# Patient Record
Sex: FEMALE | Race: WHITE | NOT HISPANIC OR LATINO | Employment: FULL TIME | URBAN - NONMETROPOLITAN AREA
[De-identification: names, ages, dates, MRNs, and addresses within clinical notes are randomized per-mention and may not be internally consistent; named-entity substitution may affect disease eponyms.]

---

## 2017-11-22 ENCOUNTER — TRANSCRIBE ORDERS (OUTPATIENT)
Dept: ADMINISTRATIVE | Facility: HOSPITAL | Age: 37
End: 2017-11-22

## 2017-11-22 DIAGNOSIS — M54.12 CERVICAL RADICULOPATHY: Primary | ICD-10-CM

## 2017-12-06 ENCOUNTER — TRANSCRIBE ORDERS (OUTPATIENT)
Dept: ADMINISTRATIVE | Facility: HOSPITAL | Age: 37
End: 2017-12-06

## 2017-12-06 DIAGNOSIS — M54.2 NECK PAIN: Primary | ICD-10-CM

## 2017-12-13 ENCOUNTER — HOSPITAL ENCOUNTER (OUTPATIENT)
Dept: MRI IMAGING | Facility: HOSPITAL | Age: 37
Discharge: HOME | End: 2017-12-13
Attending: ORTHOPAEDIC SURGERY | Admitting: ORTHOPAEDIC SURGERY

## 2017-12-13 DIAGNOSIS — M54.2 NECK PAIN: ICD-10-CM

## 2017-12-13 PROCEDURE — 72141 MRI NECK SPINE W/O DYE: CPT

## 2017-12-26 ENCOUNTER — HOSPITAL ENCOUNTER (OUTPATIENT)
Dept: NEUROLOGY | Facility: HOSPITAL | Age: 37
Discharge: HOME | End: 2017-12-26
Attending: ORTHOPAEDIC SURGERY | Admitting: ORTHOPAEDIC SURGERY

## 2017-12-26 DIAGNOSIS — M54.12 CERVICAL RADICULOPATHY: ICD-10-CM

## 2017-12-26 PROCEDURE — 95886 MUSC TEST DONE W/N TEST COMP: CPT

## 2017-12-26 PROCEDURE — 95909 NRV CNDJ TST 5-6 STUDIES: CPT | Performed by: PSYCHIATRY & NEUROLOGY

## 2017-12-26 PROCEDURE — 95909 NRV CNDJ TST 5-6 STUDIES: CPT

## 2017-12-26 PROCEDURE — 95886 MUSC TEST DONE W/N TEST COMP: CPT | Performed by: PSYCHIATRY & NEUROLOGY

## 2019-10-07 ENCOUNTER — TRANSCRIBE ORDERS (OUTPATIENT)
Dept: ADMINISTRATIVE | Facility: HOSPITAL | Age: 39
End: 2019-10-07

## 2019-10-07 DIAGNOSIS — M54.14 THORACIC RADICULOPATHY: Primary | ICD-10-CM

## 2019-10-21 ENCOUNTER — HOSPITAL ENCOUNTER (OUTPATIENT)
Dept: MRI IMAGING | Facility: HOSPITAL | Age: 39
Discharge: HOME | End: 2019-10-21
Admitting: NURSE PRACTITIONER

## 2019-10-21 DIAGNOSIS — M54.14 THORACIC RADICULOPATHY: ICD-10-CM

## 2019-10-21 PROCEDURE — 72146 MRI CHEST SPINE W/O DYE: CPT

## 2021-03-19 ENCOUNTER — TRANSCRIBE ORDERS (OUTPATIENT)
Dept: ADMINISTRATIVE | Facility: HOSPITAL | Age: 41
End: 2021-03-19

## 2021-03-19 DIAGNOSIS — M54.16 LUMBAR RADICULOPATHY: Primary | ICD-10-CM

## 2021-03-26 ENCOUNTER — HOSPITAL ENCOUNTER (OUTPATIENT)
Dept: MRI IMAGING | Facility: HOSPITAL | Age: 41
Discharge: HOME | End: 2021-03-26
Admitting: NURSE PRACTITIONER

## 2021-03-26 DIAGNOSIS — M54.16 LUMBAR RADICULOPATHY: ICD-10-CM

## 2021-03-26 PROCEDURE — 72148 MRI LUMBAR SPINE W/O DYE: CPT

## 2021-06-22 ENCOUNTER — TRANSCRIBE ORDERS (OUTPATIENT)
Dept: ADMINISTRATIVE | Facility: HOSPITAL | Age: 41
End: 2021-06-22

## 2021-06-22 DIAGNOSIS — Z01.818 PREOPERATIVE TESTING: Primary | ICD-10-CM

## 2021-06-23 ENCOUNTER — PRE-ADMISSION TESTING (OUTPATIENT)
Dept: PREADMISSION TESTING | Facility: HOSPITAL | Age: 41
End: 2021-06-23

## 2021-06-23 VITALS
HEIGHT: 65 IN | BODY MASS INDEX: 42.68 KG/M2 | RESPIRATION RATE: 20 BRPM | OXYGEN SATURATION: 99 % | SYSTOLIC BLOOD PRESSURE: 147 MMHG | DIASTOLIC BLOOD PRESSURE: 91 MMHG | WEIGHT: 256.17 LBS | HEART RATE: 87 BPM

## 2021-06-23 LAB
ALBUMIN SERPL-MCNC: 4.1 G/DL (ref 3.5–5.2)
ALBUMIN/GLOB SERPL: 1.4 G/DL
ALP SERPL-CCNC: 53 U/L (ref 39–117)
ALT SERPL W P-5'-P-CCNC: 31 U/L (ref 1–33)
ANION GAP SERPL CALCULATED.3IONS-SCNC: 10 MMOL/L (ref 5–15)
AST SERPL-CCNC: 19 U/L (ref 1–32)
BASOPHILS # BLD AUTO: 0.04 10*3/MM3 (ref 0–0.2)
BASOPHILS NFR BLD AUTO: 0.3 % (ref 0–1.5)
BILIRUB SERPL-MCNC: 0.4 MG/DL (ref 0–1.2)
BUN SERPL-MCNC: 9 MG/DL (ref 6–20)
BUN/CREAT SERPL: 13.4 (ref 7–25)
CALCIUM SPEC-SCNC: 9.5 MG/DL (ref 8.6–10.5)
CHLORIDE SERPL-SCNC: 104 MMOL/L (ref 98–107)
CO2 SERPL-SCNC: 25 MMOL/L (ref 22–29)
CREAT SERPL-MCNC: 0.67 MG/DL (ref 0.57–1)
DEPRECATED RDW RBC AUTO: 41.1 FL (ref 37–54)
EOSINOPHIL # BLD AUTO: 0.12 10*3/MM3 (ref 0–0.4)
EOSINOPHIL NFR BLD AUTO: 1 % (ref 0.3–6.2)
ERYTHROCYTE [DISTWIDTH] IN BLOOD BY AUTOMATED COUNT: 13.3 % (ref 12.3–15.4)
GFR SERPL CREATININE-BSD FRML MDRD: 97 ML/MIN/1.73
GLOBULIN UR ELPH-MCNC: 2.9 GM/DL
GLUCOSE SERPL-MCNC: 105 MG/DL (ref 65–99)
HCT VFR BLD AUTO: 43.4 % (ref 34–46.6)
HGB BLD-MCNC: 14.4 G/DL (ref 12–15.9)
LYMPHOCYTES # BLD AUTO: 4.18 10*3/MM3 (ref 0.7–3.1)
LYMPHOCYTES NFR BLD AUTO: 34.1 % (ref 19.6–45.3)
MCH RBC QN AUTO: 28.3 PG (ref 26.6–33)
MCHC RBC AUTO-ENTMCNC: 33.2 G/DL (ref 31.5–35.7)
MCV RBC AUTO: 85.4 FL (ref 79–97)
MONOCYTES # BLD AUTO: 0.83 10*3/MM3 (ref 0.1–0.9)
MONOCYTES NFR BLD AUTO: 6.8 % (ref 5–12)
NEUTROPHILS NFR BLD AUTO: 57.1 % (ref 42.7–76)
NEUTROPHILS NFR BLD AUTO: 7 10*3/MM3 (ref 1.7–7)
PLATELET # BLD AUTO: 151 10*3/MM3 (ref 140–450)
PMV BLD AUTO: 13.2 FL (ref 6–12)
POTASSIUM SERPL-SCNC: 3.8 MMOL/L (ref 3.5–5.2)
PROT SERPL-MCNC: 7 G/DL (ref 6–8.5)
RBC # BLD AUTO: 5.08 10*6/MM3 (ref 3.77–5.28)
SODIUM SERPL-SCNC: 139 MMOL/L (ref 136–145)
WBC # BLD AUTO: 12.25 10*3/MM3 (ref 3.4–10.8)

## 2021-06-23 PROCEDURE — 36415 COLL VENOUS BLD VENIPUNCTURE: CPT

## 2021-06-23 PROCEDURE — 85025 COMPLETE CBC W/AUTO DIFF WBC: CPT

## 2021-06-23 PROCEDURE — 80053 COMPREHEN METABOLIC PANEL: CPT

## 2021-06-23 RX ORDER — GABAPENTIN 300 MG/1
300 CAPSULE ORAL 3 TIMES DAILY
COMMUNITY

## 2021-06-23 RX ORDER — OXYCODONE AND ACETAMINOPHEN 10; 325 MG/1; MG/1
1 TABLET ORAL EVERY 8 HOURS PRN
COMMUNITY

## 2021-06-23 RX ORDER — TIZANIDINE 4 MG/1
4 TABLET ORAL AS NEEDED
COMMUNITY

## 2021-06-23 NOTE — DISCHARGE INSTRUCTIONS
PATIENT/FAMILY/RESPONSIBLE PARTY VERBALIZES UNDERSTANDING OF ABOVE EDUCATION.  COPY OF PAIN SCALE GIVEN AND REVIEWED WITH VERBALIZED UNDERSTANDING.  DAY OF SURGERY INSTRUCTIONS        YOUR SURGEON: Michealcristy Ortiz     PROCEDURE: Laparoscopic Cholecystectomy With Cholangiogram     DATE OF SURGERY: 06/29/21    ARRIVAL TIME: AS DIRECTED BY OFFICE    YOU MAY TAKE THE FOLLOWING MEDICATION(S) THE MORNING OF SURGERY WITH A SIP OF WATER: Percocet, Neurontin       ALL OTHER HOME MEDICATION CHECK WITH YOUR PHYSICIAN      DO NOT TAKE ANY ERECTILE DYSFUNCTION MEDICATIONS (EX: CIALIS, VIAGRA) 24 HOURS PRIOR TO SURGERY                      MANAGING PAIN AFTER SURGERY    We know you are probably wondering what your pain will be like after surgery.  Following surgery it is unrealistic to expect you will not have pain.   Pain is how our bodies let us know that something is wrong or cautions us to be careful.  That said, our goal is to make your pain tolerable.    Methods we may use to treat your pain include (oral or IV medications, PCAs, epidurals, nerve blocks, etc.)   While some procedures require IV pain medications for a short time after surgery, transitioning to pain medications by mouth allows for better management of pain.   Your nurse will encourage you to take oral pain medications whenever possible.  IV medications work almost immediately, but only last a short while.  Taking medications by mouth allows for a more constant level of medication in your blood stream for a longer period of time.      Once your pain is out of control it is harder to get back under control.  It is important you are aware when your next dose of pain medication is due.  If you are admitted, your nurse may write the time of your next dose on the white board in your room to help you remember.      We are interested in your pain and encourage you to inform us about aggravating factors during your visit.   Many times a simple repositioning every few  hours can make a big difference.    If your physician says it is okay, do not let your pain prevent you from getting out of bed. Be sure to call your nurse for assistance prior to getting up so you do not fall.      Before surgery, please decide your tolerable pain goal.  These faces help describe the pain ratings we use on a 0-10 scale.   Be prepared to tell us your goal and whether or not you take pain or anxiety medications at home.          BEFORE YOU COME TO THE HOSPITAL  (Pre-op instructions)  • Do not eat, drink, smoke or chew gum after midnight the night before surgery.  This also includes no mints.  • Morning of surgery take only the medicines you have been instructed with a sip of water unless otherwise instructed  by your physician.  • Do not shave, wear makeup or dark nail polish.  • Remove all jewelry including rings.  • Leave anything you consider valuable at home.  • Leave your suitcase in the car until after your surgery.  • Bring the following with you if applicable:  o Picture ID and insurance, Medicare or Medicaid cards  o Co-pay/deductible required by insurance (cash, check, credit card)  o Copy of advance directive, living will or power-of- documents if not brought to PAT  o CPAP or BIPAP mask and tubing  o Relaxation aids ( book, magazine), etc.  o Hearing aids                        ON THE DAY OF SURGERY  · On the day of surgery check in at registration located at the main entrance of the hospital.   ? You will be registered and given a beeper with instructions where to wait in the main lobby.  ? When your beeper lights up and vibrates a member of the Outpatient Surgery staff will meet you at the double doors under the stair steps and escort you to your preoperative room.   · You may have cloth compression devices placed on your legs. These help to prevent blood clots and reduce swelling in your legs.  · An IV may be inserted into one of your veins.  · In the operating room, you may be  "given one or more of the following:  ? A medicine to help you relax (sedative).  ? A medicine to numb the area (local anesthetic).  ? A medicine to make you fall asleep (general anesthetic).  ? A medicine that is injected into an area of your body to numb everything below the injection site (regional anesthetic).  · Your surgical site will be marked or identified.  · You may be given an antibiotic through your IV to help prevent infection.  Contact a health care provider if you:  · Develop a fever of more than 100.4°F (38°C) or other feelings of illness during the 48 hours before your surgery.  · Have symptoms that get worse.  Have questions or concerns about your surgery    General Anesthesia/Surgery, Adult  General anesthesia is the use of medicines to make a person \"go to sleep\" (unconscious) for a medical procedure. General anesthesia must be used for certain procedures, and is often recommended for procedures that:  · Last a long time.  · Require you to be still or in an unusual position.  · Are major and can cause blood loss.  The medicines used for general anesthesia are called general anesthetics. As well as making you unconscious for a certain amount of time, these medicines:  · Prevent pain.  · Control your blood pressure.  · Relax your muscles.  Tell a health care provider about:  · Any allergies you have.  · All medicines you are taking, including vitamins, herbs, eye drops, creams, and over-the-counter medicines.  · Any problems you or family members have had with anesthetic medicines.  · Types of anesthetics you have had in the past.  · Any blood disorders you have.  · Any surgeries you have had.  · Any medical conditions you have.  · Any recent upper respiratory, chest, or ear infections.  · Any history of:  ? Heart or lung conditions, such as heart failure, sleep apnea, asthma, or chronic obstructive pulmonary disease (COPD).  ?  service.  ? Depression or anxiety.  · Any tobacco or drug use, " including marijuana or alcohol use.  · Whether you are pregnant or may be pregnant.  What are the risks?  Generally, this is a safe procedure. However, problems may occur, including:  · Allergic reaction.  · Lung and heart problems.  · Inhaling food or liquid from the stomach into the lungs (aspiration).  · Nerve injury.  · Air in the bloodstream, which can lead to stroke.  · Extreme agitation or confusion (delirium) when you wake up from the anesthetic.  · Waking up during your procedure and being unable to move. This is rare.  These problems are more likely to develop if you are having a major surgery or if you have an advanced or serious medical condition. You can prevent some of these complications by answering all of your health care provider's questions thoroughly and by following all instructions before your procedure.  General anesthesia can cause side effects, including:  · Nausea or vomiting.  · A sore throat from the breathing tube.  · Hoarseness.  · Wheezing or coughing.  · Shaking chills.  · Tiredness.  · Body aches.  · Anxiety.  · Sleepiness or drowsiness.  · Confusion or agitation.  RISKS AND COMPLICATIONS OF SURGERY  Your health care provider will discuss possible risks and complications with you before surgery. Common risks and complications include:    · Problems due to the use of anesthetics.  · Blood loss and replacement (does not apply to minor surgical procedures).  · Temporary increase in pain due to surgery.  · Uncorrected pain or problems that the surgery was meant to correct.  · Infection.  · New damage.    What happens before the procedure?    Medicines  Ask your health care provider about:  · Changing or stopping your regular medicines. This is especially important if you are taking diabetes medicines or blood thinners.  · Taking medicines such as aspirin and ibuprofen. These medicines can thin your blood. Do not take these medicines unless your health care provider tells you to take  them.  · Taking over-the-counter medicines, vitamins, herbs, and supplements. Do not take these during the week before your procedure unless your health care provider approves them.  General instructions  · Starting 3-6 weeks before the procedure, do not use any products that contain nicotine or tobacco, such as cigarettes and e-cigarettes. If you need help quitting, ask your health care provider.  · If you brush your teeth on the morning of the procedure, make sure to spit out all of the toothpaste.  · Tell your health care provider if you become ill or develop a cold, cough, or fever.  · If instructed by your health care provider, bring your sleep apnea device with you on the day of your surgery (if applicable).  · Ask your health care provider if you will be going home the same day, the following day, or after a longer hospital stay.  ? Plan to have someone take you home from the hospital or clinic.  ? Plan to have a responsible adult care for you for at least 24 hours after you leave the hospital or clinic. This is important.  What happens during the procedure?  · You will be given anesthetics through both of the following:  ? A mask placed over your nose and mouth.  ? An IV in one of your veins.  · You may receive a medicine to help you relax (sedative).  · After you are unconscious, a breathing tube may be inserted down your throat to help you breathe. This will be removed before you wake up.  · An anesthesia specialist will stay with you throughout your procedure. He or she will:  ? Keep you comfortable and safe by continuing to give you medicines and adjusting the amount of medicine that you get.  ? Monitor your blood pressure, pulse, and oxygen levels to make sure that the anesthetics do not cause any problems.  The procedure may vary among health care providers and hospitals.  What happens after the procedure?  · Your blood pressure, temperature, heart rate, breathing rate, and blood oxygen level will be  monitored until the medicines you were given have worn off.  · You will wake up in a recovery area. You may wake up slowly.  · If you feel anxious or agitated, you may be given medicine to help you calm down.  · If you will be going home the same day, your health care provider may check to make sure you can walk, drink, and urinate.  · Your health care provider will treat any pain or side effects you have before you go home.  · Do not drive for 24 hours if you were given a sedative.  Summary  · General anesthesia is used to keep you still and prevent pain during a procedure.  · It is important to tell your healthcare provider about your medical history and any surgeries you have had, and previous experience with anesthesia.  · Follow your healthcare provider’s instructions about when to stop eating, drinking, or taking certain medicines before your procedure.  · Plan to have someone take you home from the hospital or clinic.  This information is not intended to replace advice given to you by your health care provider. Make sure you discuss any questions you have with your health care provider.  Document Released: 03/26/2009 Document Revised: 08/03/2018 Document Reviewed: 08/03/2018  [employer] Interactive Patient Education © 2019 [employer]       Fall Prevention in Hospitals, Adult  As a hospital patient, your condition and the treatments you receive can increase your risk for falls. Some additional risk factors for falls in a hospital include:  · Being in an unfamiliar environment.  · Being on bed rest.  · Your surgery.  · Taking certain medicines.  · Your tubing requirements, such as intravenous (IV) therapy or catheters.  It is important that you learn how to decrease fall risks while at the hospital. Below are important tips that can help prevent falls.  SAFETY TIPS FOR PREVENTING FALLS  Talk about your risk of falling.  · Ask your health care provider why you are at risk for falling. Is it your medicine,  illness, tubing placement, or something else?  · Make a plan with your health care provider to keep you safe from falls.  · Ask your health care provider or pharmacist about side effects of your medicines. Some medicines can make you dizzy or affect your coordination.  Ask for help.  · Ask for help before getting out of bed. You may need to press your call button.  · Ask for assistance in getting safely to the toilet.  · Ask for a walker or cane to be put at your bedside. Ask that most of the side rails on your bed be placed up before your health care provider leaves the room.  · Ask family or friends to sit with you.  · Ask for things that are out of your reach, such as your glasses, hearing aids, telephone, bedside table, or call button.  Follow these tips to avoid falling:  · Stay lying or seated, rather than standing, while waiting for help.  · Wear rubber-soled slippers or shoes whenever you walk in the hospital.  · Avoid quick, sudden movements.  ¨ Change positions slowly.  ¨ Sit on the side of your bed before standing.  ¨ Stand up slowly and wait before you start to walk.  · Let your health care provider know if there is a spill on the floor.  · Pay careful attention to the medical equipment, electrical cords, and tubes around you.  · When you need help, use your call button by your bed or in the bathroom. Wait for one of your health care providers to help you.  · If you feel dizzy or unsure of your footing, return to bed and wait for assistance.  · Avoid being distracted by the TV, telephone, or another person in your room.  · Do not lean or support yourself on rolling objects, such as IV poles or bedside tables.     This information is not intended to replace advice given to you by your health care provider. Make sure you discuss any questions you have with your health care provider.     Document Released: 12/15/2001 Document Revised: 01/08/2016 Document Reviewed: 08/25/2013  [employer] Patient  Education ©2016 [employer]       [facility]  CHG 4% Patient Instruction Sheet    Chlorhexidine Before Surgery  Chlorhexidine gluconate (CHG) is a germ-killing (antiseptic) solution that is used to clean the skin. It gets rid of the bacteria that normally live on the skin. Cleaning your skin with CHG before surgery helps lower the risk for infection after surgery.    How to use CHG solution  · You will take 2 showers, one shower the night before surgery, the second shower the morning of surgery before coming to the hospital.  · Use CHG only as told by your health care provider, and follow the instructions on the label.  · Use CHG solution while taking a shower. Follow these steps when using CHG solution (unless your health care provider gives you different instructions):  1. Start the shower.  2. Use your normal soap and shampoo to wash your face and hair.  3. Turn off the shower or move out of the shower stream.  4. Pour the CHG onto a clean washcloth. Do not use any type of brush or rough-edged sponge.  5. Starting at your neck, lather your body down to your toes. Make sure you:  6. Pay special attention to the part of your body where you will be having surgery. Scrub this area for at least 1 minute.  7. Use the full amount of CHG as directed. Usually, this is one half bottle for each shower.  8. Do not use CHG on your head or face. If the solution gets into your ears or eyes, rinse them well with water.  9. Avoid your genital area.  10. Avoid any areas of skin that have broken skin, cuts, or scrapes.  11. Scrub your back and under your arms. Make sure to wash skin folds.  12. Let the lather sit on your skin for 1-2 minutes or as long as told by your health care  provider.  13. Thoroughly rinse your entire body in the shower. Make sure that all body creases and crevices are rinsed well.  14. Dry off with a clean towel. Do not put any substances on your body afterward, such as powder, lotion, or  perfume.  15. Put on clean clothes or pajamas.  16. If it is the night before your surgery, sleep in clean sheets.    What are the risks?  Risks of using CHG include:  · A skin reaction.  · Hearing loss, if CHG gets in your ears.  · Eye injury, if CHG gets in your eyes and is not rinsed out.  · The CHG product catching fire.  Make sure that you avoid smoking and flames after applying CHG to your skin.  Do not use CHG:  · If you have a chlorhexidine allergy or have previously reacted to chlorhexidine.  · On babies younger than 2 months of age.      On the day of surgery, when you are taken to your room in Outpatient Surgery you will be given a CHG prepackaged cloth to wipe the site for your surgery.  How to use CHG prepackaged cloths  · Follow the instructions on the label.  · Use the CHG cloth on clean, dry skin. Follow these steps when using a CHG cloth (unless your health care provider gives you different instructions):  1. Using the CHG cloth, vigorously scrub the part of your body where you will be having surgery. Scrub using a back-and-forth motion for 3 minutes. The area on your body should be completely wet with CHG when you are finished scrubbing.  2. Do not rinse. Discard the cloth and let the area air-dry for 1 minute. Do not put any substances on your body afterward, such as powder, lotion, or perfume.  Contact a health care provider if:  · Your skin gets irritated after scrubbing.  · You have questions about using your solution or cloth.  Get help right away if:  · Your eyes become very red or swollen.  · Your eyes itch badly.  · Your skin itches badly and is red or swollen.  · Your hearing changes.  · You have trouble seeing.  · You have swelling or tingling in your mouth or throat.  · You have trouble breathing.  · You swallow any chlorhexidine.  Summary  · Chlorhexidine gluconate (CHG) is a germ-killing (antiseptic) solution that is used to clean the skin. Cleaning your skin with CHG before surgery  helps lower the risk for infection after surgery.  · You may be given CHG to use at home. It may be in a bottle or in a prepackaged cloth to use on your skin. Carefully follow your health care provider's instructions and the instructions on the product label.  · Do not use CHG if you have a chlorhexidine allergy.  · Contact your health care provider if your skin gets irritated after scrubbing.  This information is not intended to replace advice given to you by your health care provider. Make sure you discuss any questions you have with your health care provider.  Document Released: 09/11/2013 Document Revised: 11/15/2018 Document Reviewed: 11/15/2018  [employer] Interactive Patient Education © 2019 [employer]

## 2021-06-26 ENCOUNTER — LAB (OUTPATIENT)
Dept: LAB | Facility: HOSPITAL | Age: 41
End: 2021-06-26

## 2021-06-26 LAB — SARS-COV-2 ORF1AB RESP QL NAA+PROBE: NOT DETECTED

## 2021-06-26 PROCEDURE — U0004 COV-19 TEST NON-CDC HGH THRU: HCPCS | Performed by: SPECIALIST

## 2021-06-26 PROCEDURE — C9803 HOPD COVID-19 SPEC COLLECT: HCPCS | Performed by: SPECIALIST

## 2021-06-28 ENCOUNTER — ANESTHESIA EVENT (OUTPATIENT)
Dept: PERIOP | Facility: HOSPITAL | Age: 41
End: 2021-06-28

## 2021-06-29 ENCOUNTER — HOSPITAL ENCOUNTER (OUTPATIENT)
Facility: HOSPITAL | Age: 41
Setting detail: HOSPITAL OUTPATIENT SURGERY
Discharge: HOME | End: 2021-06-29
Attending: SPECIALIST | Admitting: SPECIALIST

## 2021-06-29 ENCOUNTER — ANESTHESIA (OUTPATIENT)
Dept: PERIOP | Facility: HOSPITAL | Age: 41
End: 2021-06-29

## 2021-06-29 ENCOUNTER — APPOINTMENT (OUTPATIENT)
Dept: GENERAL RADIOLOGY | Facility: HOSPITAL | Age: 41
End: 2021-06-29

## 2021-06-29 VITALS
DIASTOLIC BLOOD PRESSURE: 88 MMHG | OXYGEN SATURATION: 99 % | HEART RATE: 70 BPM | TEMPERATURE: 97 F | SYSTOLIC BLOOD PRESSURE: 147 MMHG | RESPIRATION RATE: 18 BRPM

## 2021-06-29 DIAGNOSIS — K80.20 CHOLELITHIASIS: ICD-10-CM

## 2021-06-29 DIAGNOSIS — K81.9 CHOLECYSTITIS: ICD-10-CM

## 2021-06-29 DIAGNOSIS — K80.12 CALCULUS OF GALLBLADDER WITH ACUTE ON CHRONIC CHOLECYSTITIS WITHOUT OBSTRUCTION: ICD-10-CM

## 2021-06-29 DIAGNOSIS — K76.0 FATTY LIVER: Primary | ICD-10-CM

## 2021-06-29 LAB — B-HCG UR QL: NEGATIVE

## 2021-06-29 PROCEDURE — 88304 TISSUE EXAM BY PATHOLOGIST: CPT | Performed by: SPECIALIST

## 2021-06-29 PROCEDURE — C1726 CATH, BAL DIL, NON-VASCULAR: HCPCS | Performed by: SPECIALIST

## 2021-06-29 PROCEDURE — 25010000002 CEFOXITIN: Performed by: SPECIALIST

## 2021-06-29 PROCEDURE — C1889 IMPLANT/INSERT DEVICE, NOC: HCPCS | Performed by: SPECIALIST

## 2021-06-29 PROCEDURE — 25010000002 MIDAZOLAM PER 1 MG: Performed by: NURSE ANESTHETIST, CERTIFIED REGISTERED

## 2021-06-29 PROCEDURE — 88307 TISSUE EXAM BY PATHOLOGIST: CPT | Performed by: SPECIALIST

## 2021-06-29 PROCEDURE — 25010000002 PROPOFOL 10 MG/ML EMULSION: Performed by: NURSE ANESTHETIST, CERTIFIED REGISTERED

## 2021-06-29 PROCEDURE — 25010000002 ONDANSETRON PER 1 MG: Performed by: NURSE ANESTHETIST, CERTIFIED REGISTERED

## 2021-06-29 PROCEDURE — 76000 FLUOROSCOPY <1 HR PHYS/QHP: CPT

## 2021-06-29 PROCEDURE — 25010000002 DEXAMETHASONE PER 1 MG: Performed by: NURSE ANESTHETIST, CERTIFIED REGISTERED

## 2021-06-29 PROCEDURE — 74300 X-RAY BILE DUCTS/PANCREAS: CPT

## 2021-06-29 PROCEDURE — 81025 URINE PREGNANCY TEST: CPT | Performed by: SPECIALIST

## 2021-06-29 PROCEDURE — 25010000002 DROPERIDOL PER 5 MG: Performed by: ANESTHESIOLOGY

## 2021-06-29 PROCEDURE — 25010000002 FENTANYL CITRATE (PF) 100 MCG/2ML SOLUTION: Performed by: NURSE ANESTHETIST, CERTIFIED REGISTERED

## 2021-06-29 PROCEDURE — 25010000002 IOPAMIDOL 61 % SOLUTION: Performed by: SPECIALIST

## 2021-06-29 PROCEDURE — 25010000002 CEFOXITIN PER 1 G: Performed by: SPECIALIST

## 2021-06-29 DEVICE — LIGACLIP 10-M/L, 10MM ENDOSCOPIC ROTATING MULTIPLE CLIP APPLIERS
Type: IMPLANTABLE DEVICE | Site: ABDOMEN | Status: FUNCTIONAL
Brand: LIGACLIP

## 2021-06-29 RX ORDER — OXYCODONE AND ACETAMINOPHEN 10; 325 MG/1; MG/1
1 TABLET ORAL ONCE AS NEEDED
Status: COMPLETED | OUTPATIENT
Start: 2021-06-29 | End: 2021-06-29

## 2021-06-29 RX ORDER — SODIUM CHLORIDE 9 MG/ML
INJECTION, SOLUTION INTRAVENOUS AS NEEDED
Status: DISCONTINUED | OUTPATIENT
Start: 2021-06-29 | End: 2021-06-29 | Stop reason: HOSPADM

## 2021-06-29 RX ORDER — PROPOFOL 10 MG/ML
VIAL (ML) INTRAVENOUS AS NEEDED
Status: DISCONTINUED | OUTPATIENT
Start: 2021-06-29 | End: 2021-06-29 | Stop reason: SURG

## 2021-06-29 RX ORDER — LIDOCAINE HYDROCHLORIDE 20 MG/ML
INJECTION, SOLUTION EPIDURAL; INFILTRATION; INTRACAUDAL; PERINEURAL AS NEEDED
Status: DISCONTINUED | OUTPATIENT
Start: 2021-06-29 | End: 2021-06-29 | Stop reason: SURG

## 2021-06-29 RX ORDER — OXYCODONE AND ACETAMINOPHEN 7.5; 325 MG/1; MG/1
2 TABLET ORAL EVERY 4 HOURS PRN
Status: DISCONTINUED | OUTPATIENT
Start: 2021-06-29 | End: 2021-06-29 | Stop reason: HOSPADM

## 2021-06-29 RX ORDER — ONDANSETRON HYDROCHLORIDE 8 MG/1
4 TABLET, FILM COATED ORAL EVERY 8 HOURS PRN
Qty: 10 TABLET | Refills: 1 | Status: SHIPPED | OUTPATIENT
Start: 2021-06-29

## 2021-06-29 RX ORDER — MAGNESIUM HYDROXIDE 1200 MG/15ML
LIQUID ORAL AS NEEDED
Status: DISCONTINUED | OUTPATIENT
Start: 2021-06-29 | End: 2021-06-29 | Stop reason: HOSPADM

## 2021-06-29 RX ORDER — IBUPROFEN 600 MG/1
600 TABLET ORAL ONCE AS NEEDED
Status: DISCONTINUED | OUTPATIENT
Start: 2021-06-29 | End: 2021-06-29 | Stop reason: HOSPADM

## 2021-06-29 RX ORDER — LIDOCAINE HYDROCHLORIDE 40 MG/ML
SOLUTION TOPICAL AS NEEDED
Status: DISCONTINUED | OUTPATIENT
Start: 2021-06-29 | End: 2021-06-29 | Stop reason: SURG

## 2021-06-29 RX ORDER — MIDAZOLAM HYDROCHLORIDE 1 MG/ML
1 INJECTION INTRAMUSCULAR; INTRAVENOUS
Status: COMPLETED | OUTPATIENT
Start: 2021-06-29 | End: 2021-06-29

## 2021-06-29 RX ORDER — DEXAMETHASONE SODIUM PHOSPHATE 4 MG/ML
INJECTION, SOLUTION INTRA-ARTICULAR; INTRALESIONAL; INTRAMUSCULAR; INTRAVENOUS; SOFT TISSUE AS NEEDED
Status: DISCONTINUED | OUTPATIENT
Start: 2021-06-29 | End: 2021-06-29 | Stop reason: SURG

## 2021-06-29 RX ORDER — DROPERIDOL 2.5 MG/ML
0.62 INJECTION, SOLUTION INTRAMUSCULAR; INTRAVENOUS ONCE
Status: COMPLETED | OUTPATIENT
Start: 2021-06-29 | End: 2021-06-29

## 2021-06-29 RX ORDER — ONDANSETRON 2 MG/ML
4 INJECTION INTRAMUSCULAR; INTRAVENOUS ONCE AS NEEDED
Status: COMPLETED | OUTPATIENT
Start: 2021-06-29 | End: 2021-06-29

## 2021-06-29 RX ORDER — HYDROCODONE BITARTRATE AND ACETAMINOPHEN 7.5; 325 MG/1; MG/1
1 TABLET ORAL EVERY 4 HOURS PRN
Qty: 20 TABLET | Refills: 0 | Status: SHIPPED | OUTPATIENT
Start: 2021-06-29

## 2021-06-29 RX ORDER — SUFENTANIL CITRATE 50 UG/ML
INJECTION EPIDURAL; INTRAVENOUS AS NEEDED
Status: DISCONTINUED | OUTPATIENT
Start: 2021-06-29 | End: 2021-06-29 | Stop reason: SURG

## 2021-06-29 RX ORDER — FENTANYL CITRATE 50 UG/ML
INJECTION, SOLUTION INTRAMUSCULAR; INTRAVENOUS AS NEEDED
Status: DISCONTINUED | OUTPATIENT
Start: 2021-06-29 | End: 2021-06-29 | Stop reason: SURG

## 2021-06-29 RX ORDER — VECURONIUM BROMIDE 1 MG/ML
INJECTION, POWDER, LYOPHILIZED, FOR SOLUTION INTRAVENOUS AS NEEDED
Status: DISCONTINUED | OUTPATIENT
Start: 2021-06-29 | End: 2021-06-29 | Stop reason: SURG

## 2021-06-29 RX ORDER — FLUMAZENIL 0.1 MG/ML
0.2 INJECTION INTRAVENOUS AS NEEDED
Status: DISCONTINUED | OUTPATIENT
Start: 2021-06-29 | End: 2021-06-29 | Stop reason: HOSPADM

## 2021-06-29 RX ORDER — SODIUM CHLORIDE, SODIUM LACTATE, POTASSIUM CHLORIDE, CALCIUM CHLORIDE 600; 310; 30; 20 MG/100ML; MG/100ML; MG/100ML; MG/100ML
1000 INJECTION, SOLUTION INTRAVENOUS CONTINUOUS
Status: DISCONTINUED | OUTPATIENT
Start: 2021-06-29 | End: 2021-06-29 | Stop reason: HOSPADM

## 2021-06-29 RX ORDER — SODIUM CHLORIDE, SODIUM LACTATE, POTASSIUM CHLORIDE, CALCIUM CHLORIDE 600; 310; 30; 20 MG/100ML; MG/100ML; MG/100ML; MG/100ML
100 INJECTION, SOLUTION INTRAVENOUS CONTINUOUS
Status: DISCONTINUED | OUTPATIENT
Start: 2021-06-29 | End: 2021-06-29 | Stop reason: HOSPADM

## 2021-06-29 RX ORDER — NALOXONE HCL 0.4 MG/ML
0.4 VIAL (ML) INJECTION AS NEEDED
Status: DISCONTINUED | OUTPATIENT
Start: 2021-06-29 | End: 2021-06-29 | Stop reason: HOSPADM

## 2021-06-29 RX ORDER — LIDOCAINE HYDROCHLORIDE 10 MG/ML
0.5 INJECTION, SOLUTION EPIDURAL; INFILTRATION; INTRACAUDAL; PERINEURAL ONCE AS NEEDED
Status: DISCONTINUED | OUTPATIENT
Start: 2021-06-29 | End: 2021-06-29 | Stop reason: HOSPADM

## 2021-06-29 RX ORDER — EPHEDRINE SULFATE 50 MG/ML
INJECTION, SOLUTION INTRAVENOUS AS NEEDED
Status: DISCONTINUED | OUTPATIENT
Start: 2021-06-29 | End: 2021-06-29 | Stop reason: SURG

## 2021-06-29 RX ORDER — LABETALOL HYDROCHLORIDE 5 MG/ML
5 INJECTION, SOLUTION INTRAVENOUS
Status: DISCONTINUED | OUTPATIENT
Start: 2021-06-29 | End: 2021-06-29 | Stop reason: HOSPADM

## 2021-06-29 RX ORDER — SODIUM CHLORIDE 0.9 % (FLUSH) 0.9 %
3-10 SYRINGE (ML) INJECTION AS NEEDED
Status: DISCONTINUED | OUTPATIENT
Start: 2021-06-29 | End: 2021-06-29 | Stop reason: HOSPADM

## 2021-06-29 RX ORDER — SODIUM CHLORIDE 0.9 % (FLUSH) 0.9 %
3 SYRINGE (ML) INJECTION EVERY 12 HOURS SCHEDULED
Status: DISCONTINUED | OUTPATIENT
Start: 2021-06-29 | End: 2021-06-29 | Stop reason: HOSPADM

## 2021-06-29 RX ORDER — BUPIVACAINE HYDROCHLORIDE AND EPINEPHRINE 5; 5 MG/ML; UG/ML
INJECTION, SOLUTION PERINEURAL AS NEEDED
Status: DISCONTINUED | OUTPATIENT
Start: 2021-06-29 | End: 2021-06-29 | Stop reason: HOSPADM

## 2021-06-29 RX ORDER — FENTANYL CITRATE 50 UG/ML
25 INJECTION, SOLUTION INTRAMUSCULAR; INTRAVENOUS
Status: DISCONTINUED | OUTPATIENT
Start: 2021-06-29 | End: 2021-06-29 | Stop reason: HOSPADM

## 2021-06-29 RX ORDER — SODIUM CHLORIDE 0.9 % (FLUSH) 0.9 %
3 SYRINGE (ML) INJECTION AS NEEDED
Status: DISCONTINUED | OUTPATIENT
Start: 2021-06-29 | End: 2021-06-29 | Stop reason: HOSPADM

## 2021-06-29 RX ORDER — ACETAMINOPHEN 500 MG
1000 TABLET ORAL ONCE
Status: COMPLETED | OUTPATIENT
Start: 2021-06-29 | End: 2021-06-29

## 2021-06-29 RX ORDER — KETOROLAC TROMETHAMINE 10 MG/1
10 TABLET, FILM COATED ORAL EVERY 6 HOURS PRN
Qty: 20 TABLET | Refills: 1 | Status: SHIPPED | OUTPATIENT
Start: 2021-06-29

## 2021-06-29 RX ADMIN — MIDAZOLAM 1 MG: 1 INJECTION INTRAMUSCULAR; INTRAVENOUS at 07:07

## 2021-06-29 RX ADMIN — EPHEDRINE SULFATE 10 MG: 50 INJECTION INTRAVENOUS at 07:27

## 2021-06-29 RX ADMIN — ACETAMINOPHEN 1000 MG: 500 TABLET, FILM COATED ORAL at 06:56

## 2021-06-29 RX ADMIN — PROPOFOL 200 MG: 10 INJECTION, EMULSION INTRAVENOUS at 07:21

## 2021-06-29 RX ADMIN — SUFENTANIL CITRATE 50 MCG: 50 INJECTION EPIDURAL; INTRAVENOUS at 07:21

## 2021-06-29 RX ADMIN — OXYCODONE AND ACETAMINOPHEN 1 TABLET: 325; 10 TABLET ORAL at 09:40

## 2021-06-29 RX ADMIN — LIDOCAINE HYDROCHLORIDE 1 EACH: 40 SOLUTION TOPICAL at 07:21

## 2021-06-29 RX ADMIN — DROPERIDOL 0.62 MG: 2.5 INJECTION, SOLUTION INTRAMUSCULAR; INTRAVENOUS at 09:24

## 2021-06-29 RX ADMIN — VECURONIUM BROMIDE 8 MG: 1 INJECTION, POWDER, LYOPHILIZED, FOR SOLUTION INTRAVENOUS at 07:21

## 2021-06-29 RX ADMIN — CEFOXITIN 2 G: 2 INJECTION, POWDER, FOR SOLUTION INTRAVENOUS at 07:18

## 2021-06-29 RX ADMIN — DEXAMETHASONE SODIUM PHOSPHATE 4 MG: 4 INJECTION, SOLUTION INTRA-ARTICULAR; INTRALESIONAL; INTRAMUSCULAR; INTRAVENOUS; SOFT TISSUE at 08:41

## 2021-06-29 RX ADMIN — MIDAZOLAM 1 MG: 1 INJECTION INTRAMUSCULAR; INTRAVENOUS at 06:56

## 2021-06-29 RX ADMIN — ONDANSETRON 4 MG: 2 INJECTION INTRAMUSCULAR; INTRAVENOUS at 08:41

## 2021-06-29 RX ADMIN — FENTANYL CITRATE 100 MCG: 50 INJECTION, SOLUTION INTRAMUSCULAR; INTRAVENOUS at 08:39

## 2021-06-29 RX ADMIN — SODIUM CHLORIDE, POTASSIUM CHLORIDE, SODIUM LACTATE AND CALCIUM CHLORIDE 1000 ML: 600; 310; 30; 20 INJECTION, SOLUTION INTRAVENOUS at 06:11

## 2021-06-29 RX ADMIN — LIDOCAINE HYDROCHLORIDE 100 MG: 20 INJECTION, SOLUTION EPIDURAL; INFILTRATION; INTRACAUDAL; PERINEURAL at 07:21

## 2021-06-29 NOTE — ANESTHESIA PROCEDURE NOTES
Airway  Urgency: elective    Date/Time: 6/29/2021 7:21 AM  Airway not difficult    General Information and Staff    Patient location during procedure: OR  CRNA: Gonzalo Bryant CRNA    Indications and Patient Condition  Indications for airway management: airway protection    Preoxygenated: yes  MILS maintained throughout  Mask difficulty assessment: 1 - vent by mask    Final Airway Details  Final airway type: endotracheal airway      Successful airway: ETT  Cuffed: yes   Successful intubation technique: direct laryngoscopy  Endotracheal tube insertion site: oral  Blade: Daigle  Blade size: 2  ETT size (mm): 7.5  Cormack-Lehane Classification: grade I - full view of glottis  Placement verified by: chest auscultation and capnometry   Cuff volume (mL): 5  Measured from: lips  ETT/EBT  to lips (cm): 20  Number of attempts at approach: 1  Assessment: lips, teeth, and gum same as pre-op and atraumatic intubation

## 2021-06-29 NOTE — ANESTHESIA PREPROCEDURE EVALUATION
Anesthesia Evaluation     Patient summary reviewed and Nursing notes reviewed   no history of anesthetic complications:  NPO Solid Status: > 8 hours  NPO Liquid Status: > 8 hours           Airway   Mallampati: II  No difficulty expected  Dental    (+) poor dentition    Pulmonary    (+) a smoker Current Abstained day of surgery,   (-) asthma, sleep apnea  Cardiovascular - negative cardio ROS  Exercise tolerance: good (4-7 METS)    (-) valvular problems/murmurs, dysrhythmias, angina      Neuro/Psych- negative ROS  (-) seizures  GI/Hepatic/Renal/Endo    (+) morbid obesity,    (-) liver disease, no renal disease, diabetes, no thyroid disorder    Musculoskeletal (-) negative ROS    Abdominal    Substance History - negative use     OB/GYN negative ob/gyn ROS         Other                        Anesthesia Plan    ASA 3     general     intravenous induction     Anesthetic plan, all risks, benefits, and alternatives have been provided, discussed and informed consent has been obtained with: patient.

## 2021-06-29 NOTE — ANESTHESIA POSTPROCEDURE EVALUATION
Patient: Nataly Adamson    Procedure Summary     Date: 06/29/21 Room / Location: [facility] OR  /  [facility] OR    Anesthesia Start: 0718 Anesthesia Stop: 0903    Procedure: LAPAROSCOPIC CHOLECYSTECTOMY WITH CHOLANGIOGRAM, LAPROSCOPIC LIVER BIOPSY (N/A Abdomen) Diagnosis: (CHOLELITHIASIS)    Surgeons: Micheal Ortiz MD Provider: Gonzalo Bryant CRNA    Anesthesia Type: general ASA Status: 3          Anesthesia Type: general    Vitals  No vitals data found for the desired time range.          Post Anesthesia Care and Evaluation    Patient location during evaluation: PACU  Patient participation: complete - patient participated  Level of consciousness: awake and alert  Pain management: adequate  Airway patency: patent  Anesthetic complications: No anesthetic complications    Cardiovascular status: acceptable  Respiratory status: acceptable  Hydration status: acceptable    Comments: Blood pressure 142/93, pulse 75, temperature 97.6 °F (36.4 °C), temperature source Temporal, resp. rate 18, last menstrual period 06/16/2021, SpO2 98 %, not currently breastfeeding.    Pt discharged from PACU based on bettina score >8

## 2021-06-30 LAB
CYTO UR: NORMAL
LAB AP CASE REPORT: NORMAL
PATH REPORT.FINAL DX SPEC: NORMAL
PATH REPORT.GROSS SPEC: NORMAL

## 2021-12-27 ENCOUNTER — APPOINTMENT (OUTPATIENT)
Dept: MRI IMAGING | Age: 41
DRG: 442 | End: 2021-12-27
Payer: COMMERCIAL

## 2021-12-27 ENCOUNTER — HOSPITAL ENCOUNTER (INPATIENT)
Age: 41
LOS: 8 days | Discharge: HOME | DRG: 442 | End: 2022-01-04
Attending: EMERGENCY MEDICINE
Payer: COMMERCIAL

## 2021-12-27 ENCOUNTER — APPOINTMENT (OUTPATIENT)
Dept: CT IMAGING | Age: 41
DRG: 442 | End: 2021-12-27
Payer: COMMERCIAL

## 2021-12-27 DIAGNOSIS — R17 JAUNDICE, NON-NEONATAL: Primary | ICD-10-CM

## 2021-12-27 LAB
ALBUMIN SERPL-MCNC: 4.4 G/DL (ref 3.5–5.2)
ALP BLD-CCNC: 226 U/L (ref 35–104)
ALT SERPL-CCNC: 216 U/L (ref 5–33)
ANION GAP SERPL CALCULATED.3IONS-SCNC: 13 MMOL/L (ref 7–19)
APTT: 27.4 SEC (ref 26–36.2)
AST SERPL-CCNC: 161 U/L (ref 5–32)
BASOPHILS ABSOLUTE: 0 K/UL (ref 0–0.2)
BASOPHILS RELATIVE PERCENT: 0 % (ref 0–1)
BILIRUB SERPL-MCNC: 5.9 MG/DL (ref 0.2–1.2)
BILIRUBIN DIRECT: 4.3 MG/DL (ref 0–0.3)
BILIRUBIN, INDIRECT: 1.6 MG/DL (ref 0.1–1)
BUN BLDV-MCNC: 9 MG/DL (ref 6–20)
CALCIUM SERPL-MCNC: 10 MG/DL (ref 8.6–10)
CHLORIDE BLD-SCNC: 101 MMOL/L (ref 98–111)
CO2: 24 MMOL/L (ref 22–29)
CREAT SERPL-MCNC: 0.5 MG/DL (ref 0.5–0.9)
EOSINOPHILS ABSOLUTE: 0.49 K/UL (ref 0–0.6)
EOSINOPHILS RELATIVE PERCENT: 6 % (ref 0–5)
GFR AFRICAN AMERICAN: >59
GFR NON-AFRICAN AMERICAN: >60
GLUCOSE BLD-MCNC: 109 MG/DL (ref 74–109)
HAV IGM SER IA-ACNC: NORMAL
HCT VFR BLD CALC: 49.7 % (ref 37–47)
HEMOGLOBIN: 15.7 G/DL (ref 12–16)
HEPATITIS B CORE IGM ANTIBODY: NORMAL
HEPATITIS B SURFACE ANTIGEN INTERPRETATION: NORMAL
HEPATITIS C ANTIBODY INTERPRETATION: NORMAL
IMMATURE GRANULOCYTES #: 0 K/UL
INR BLD: 0.86 (ref 0.88–1.18)
LACTIC ACID: 0.9 MMOL/L (ref 0.5–1.9)
LIPASE: 21 U/L (ref 13–60)
LYMPHOCYTES ABSOLUTE: 3.4 K/UL (ref 1.1–4.5)
LYMPHOCYTES RELATIVE PERCENT: 42 % (ref 20–40)
MCH RBC QN AUTO: 28.2 PG (ref 27–31)
MCHC RBC AUTO-ENTMCNC: 31.6 G/DL (ref 33–37)
MCV RBC AUTO: 89.4 FL (ref 81–99)
MONOCYTES ABSOLUTE: 0.5 K/UL (ref 0–0.9)
MONOCYTES RELATIVE PERCENT: 6 % (ref 0–10)
NEUTROPHILS ABSOLUTE: 3.8 K/UL (ref 1.5–7.5)
NEUTROPHILS RELATIVE PERCENT: 46 % (ref 50–65)
PDW BLD-RTO: 14.7 % (ref 11.5–14.5)
PLATELET # BLD: 161 K/UL (ref 130–400)
PLATELET SLIDE REVIEW: ADEQUATE
PMV BLD AUTO: 13 FL (ref 9.4–12.3)
POTASSIUM SERPL-SCNC: 4.2 MMOL/L (ref 3.5–5)
PROTHROMBIN TIME: 12 SEC (ref 12–14.6)
RBC # BLD: 5.56 M/UL (ref 4.2–5.4)
SARS-COV-2, NAAT: NOT DETECTED
SODIUM BLD-SCNC: 138 MMOL/L (ref 136–145)
TOTAL PROTEIN: 7.9 G/DL (ref 6.6–8.7)
WBC # BLD: 8.2 K/UL (ref 4.8–10.8)

## 2021-12-27 PROCEDURE — 80074 ACUTE HEPATITIS PANEL: CPT

## 2021-12-27 PROCEDURE — 1210000000 HC MED SURG R&B

## 2021-12-27 PROCEDURE — 74181 MRI ABDOMEN W/O CONTRAST: CPT

## 2021-12-27 PROCEDURE — 87635 SARS-COV-2 COVID-19 AMP PRB: CPT

## 2021-12-27 PROCEDURE — 85610 PROTHROMBIN TIME: CPT

## 2021-12-27 PROCEDURE — 96374 THER/PROPH/DIAG INJ IV PUSH: CPT

## 2021-12-27 PROCEDURE — 99282 EMERGENCY DEPT VISIT SF MDM: CPT

## 2021-12-27 PROCEDURE — 6360000002 HC RX W HCPCS: Performed by: EMERGENCY MEDICINE

## 2021-12-27 PROCEDURE — 74177 CT ABD & PELVIS W/CONTRAST: CPT

## 2021-12-27 PROCEDURE — 80053 COMPREHEN METABOLIC PANEL: CPT

## 2021-12-27 PROCEDURE — 87040 BLOOD CULTURE FOR BACTERIA: CPT

## 2021-12-27 PROCEDURE — 82248 BILIRUBIN DIRECT: CPT

## 2021-12-27 PROCEDURE — 6360000004 HC RX CONTRAST MEDICATION: Performed by: EMERGENCY MEDICINE

## 2021-12-27 PROCEDURE — 83690 ASSAY OF LIPASE: CPT

## 2021-12-27 PROCEDURE — 85730 THROMBOPLASTIN TIME PARTIAL: CPT

## 2021-12-27 PROCEDURE — 6360000002 HC RX W HCPCS

## 2021-12-27 PROCEDURE — 85025 COMPLETE CBC W/AUTO DIFF WBC: CPT

## 2021-12-27 PROCEDURE — 36415 COLL VENOUS BLD VENIPUNCTURE: CPT

## 2021-12-27 PROCEDURE — 83605 ASSAY OF LACTIC ACID: CPT

## 2021-12-27 RX ORDER — LORAZEPAM 2 MG/ML
INJECTION INTRAMUSCULAR
Status: COMPLETED
Start: 2021-12-27 | End: 2021-12-27

## 2021-12-27 RX ORDER — SODIUM CHLORIDE 9 MG/ML
25 INJECTION, SOLUTION INTRAVENOUS PRN
Status: DISCONTINUED | OUTPATIENT
Start: 2021-12-27 | End: 2022-01-04 | Stop reason: HOSPADM

## 2021-12-27 RX ORDER — POLYETHYLENE GLYCOL 3350 17 G/17G
17 POWDER, FOR SOLUTION ORAL DAILY PRN
Status: DISCONTINUED | OUTPATIENT
Start: 2021-12-27 | End: 2022-01-04 | Stop reason: HOSPADM

## 2021-12-27 RX ORDER — SODIUM CHLORIDE 9 MG/ML
INJECTION, SOLUTION INTRAVENOUS CONTINUOUS
Status: DISCONTINUED | OUTPATIENT
Start: 2021-12-27 | End: 2022-01-04 | Stop reason: HOSPADM

## 2021-12-27 RX ORDER — ONDANSETRON 4 MG/1
4 TABLET, ORALLY DISINTEGRATING ORAL EVERY 8 HOURS PRN
Status: DISCONTINUED | OUTPATIENT
Start: 2021-12-27 | End: 2022-01-04 | Stop reason: HOSPADM

## 2021-12-27 RX ORDER — LORAZEPAM 2 MG/ML
1 INJECTION INTRAMUSCULAR ONCE
Status: COMPLETED | OUTPATIENT
Start: 2021-12-27 | End: 2021-12-27

## 2021-12-27 RX ORDER — SODIUM CHLORIDE 0.9 % (FLUSH) 0.9 %
5-40 SYRINGE (ML) INJECTION EVERY 12 HOURS SCHEDULED
Status: DISCONTINUED | OUTPATIENT
Start: 2021-12-27 | End: 2022-01-04 | Stop reason: HOSPADM

## 2021-12-27 RX ORDER — ONDANSETRON 2 MG/ML
4 INJECTION INTRAMUSCULAR; INTRAVENOUS EVERY 6 HOURS PRN
Status: DISCONTINUED | OUTPATIENT
Start: 2021-12-27 | End: 2022-01-04 | Stop reason: HOSPADM

## 2021-12-27 RX ORDER — SODIUM CHLORIDE 0.9 % (FLUSH) 0.9 %
5-40 SYRINGE (ML) INJECTION PRN
Status: DISCONTINUED | OUTPATIENT
Start: 2021-12-27 | End: 2022-01-04 | Stop reason: HOSPADM

## 2021-12-27 RX ORDER — MORPHINE SULFATE 4 MG/ML
4 INJECTION, SOLUTION INTRAMUSCULAR; INTRAVENOUS ONCE
Status: COMPLETED | OUTPATIENT
Start: 2021-12-27 | End: 2021-12-27

## 2021-12-27 RX ORDER — MEROPENEM AND SODIUM CHLORIDE 1 G/50ML
1000 INJECTION, SOLUTION INTRAVENOUS ONCE
Status: COMPLETED | OUTPATIENT
Start: 2021-12-27 | End: 2021-12-28

## 2021-12-27 RX ADMIN — LORAZEPAM 1 MG: 2 INJECTION INTRAMUSCULAR at 21:28

## 2021-12-27 RX ADMIN — IOPAMIDOL 90 ML: 755 INJECTION, SOLUTION INTRAVENOUS at 17:43

## 2021-12-27 RX ADMIN — LORAZEPAM 1 MG: 2 INJECTION, SOLUTION INTRAMUSCULAR; INTRAVENOUS at 21:28

## 2021-12-27 RX ADMIN — MEROPENEM AND SODIUM CHLORIDE 1000 MG: 1 INJECTION, SOLUTION INTRAVENOUS at 23:53

## 2021-12-27 RX ADMIN — MORPHINE SULFATE 4 MG: 4 INJECTION, SOLUTION INTRAMUSCULAR; INTRAVENOUS at 23:53

## 2021-12-27 ASSESSMENT — PAIN DESCRIPTION - LOCATION: LOCATION: ABDOMEN

## 2021-12-27 ASSESSMENT — PAIN DESCRIPTION - PAIN TYPE: TYPE: ACUTE PAIN

## 2021-12-27 ASSESSMENT — PAIN DESCRIPTION - ORIENTATION: ORIENTATION: UPPER

## 2021-12-27 ASSESSMENT — PAIN SCALES - GENERAL
PAINLEVEL_OUTOF10: 7
PAINLEVEL_OUTOF10: 6

## 2021-12-27 NOTE — ED PROVIDER NOTES
[facility] EMERGENCY DEPT    Pt Name: Mikie Mcgee  MRN: [id number]  [place] 1980  Date of evaluation: 12/27/2021    As physician-in-triage, I performed a medical screening history and physical examination on this patient. HISTORY OF PRESENT ILLNESS  Mikie Mcgee is a 42 y.o. female presents for concern of scleral icterus since yesterday. Patient admits she has had n/v for past week with epigastric pain. Has had GB removed in June 2021. No etoh abuse. Subj fever and chills. No diarrhea. PHYSICAL EXAM  There were no vitals taken for this visit. On exam, the patient appears in no acute distress. Speech is clear. Breathing is unlabored. Moves all extremities and is ambulatory. In order to expedite ED evaluation, initial laboratory/radiology orders as indicated were placed at this time. Due to ED capacity, patient was returned to the waiting room pending bed availability.     (Please note that portions of this note were completed with a voice recognition program.  Efforts were made to edit the dictations but occasionally words are mis-transcribed.)    Anne Marie Hdz MD (electronically signed)  Attending Emergency Physician                Jakub Sutherland MD  12/27/21 9778 The wound was explored to base in bloodless field./No foreign body

## 2021-12-27 NOTE — LETTER
[facility] 5 Surg Services  [address]   [address]  Phone: [phone]    No name on file. January 4, 2022     Patient: Boston Ayoub   YOB: 1980   Date of Visit: 12/27/2021       To Whom It May Concern:  Boston Ayoub was in the hospital from 12/27/22 thru 1/04/22 please excuse her from any missed days of work. in my medical opinion she can return to work on Thursday 01/06/22    If you have any questions or concerns, please don't hesitate to call.     Sincerely,        Shane Stokes RN

## 2021-12-28 ENCOUNTER — APPOINTMENT (OUTPATIENT)
Dept: NUCLEAR MEDICINE | Age: 41
DRG: 442 | End: 2021-12-28
Payer: COMMERCIAL

## 2021-12-28 ENCOUNTER — APPOINTMENT (OUTPATIENT)
Dept: ULTRASOUND IMAGING | Age: 41
DRG: 442 | End: 2021-12-28
Payer: COMMERCIAL

## 2021-12-28 PROBLEM — N94.89 ADNEXAL MASS: Status: ACTIVE | Noted: 2021-12-28

## 2021-12-28 LAB
ACETAMINOPHEN LEVEL: <15 UG/ML
ALBUMIN SERPL-MCNC: 3.9 G/DL (ref 3.5–5.2)
ALP BLD-CCNC: 204 U/L (ref 35–104)
ALT SERPL-CCNC: 189 U/L (ref 5–33)
ANION GAP SERPL CALCULATED.3IONS-SCNC: 13 MMOL/L (ref 7–19)
AST SERPL-CCNC: 141 U/L (ref 5–32)
BILIRUB SERPL-MCNC: 6.8 MG/DL (ref 0.2–1.2)
BUN BLDV-MCNC: 8 MG/DL (ref 6–20)
CALCIUM SERPL-MCNC: 9.3 MG/DL (ref 8.6–10)
CHLORIDE BLD-SCNC: 103 MMOL/L (ref 98–111)
CHOLESTEROL, TOTAL: 305 MG/DL (ref 160–199)
CO2: 22 MMOL/L (ref 22–29)
CREAT SERPL-MCNC: 0.6 MG/DL (ref 0.5–0.9)
GFR AFRICAN AMERICAN: >59
GFR NON-AFRICAN AMERICAN: >60
GLUCOSE BLD-MCNC: 93 MG/DL (ref 74–109)
HAPTOGLOBIN: 91 MG/DL (ref 30–200)
HCG QUALITATIVE: NEGATIVE
HDLC SERPL-MCNC: 35 MG/DL (ref 65–121)
LDL CHOLESTEROL CALCULATED: 246 MG/DL
POTASSIUM SERPL-SCNC: 4.4 MMOL/L (ref 3.5–5)
SODIUM BLD-SCNC: 138 MMOL/L (ref 136–145)
TOTAL PROTEIN: 6.3 G/DL (ref 6.6–8.7)
TRIGL SERPL-MCNC: 122 MG/DL (ref 0–149)

## 2021-12-28 PROCEDURE — 87040 BLOOD CULTURE FOR BACTERIA: CPT

## 2021-12-28 PROCEDURE — 36415 COLL VENOUS BLD VENIPUNCTURE: CPT

## 2021-12-28 PROCEDURE — 76705 ECHO EXAM OF ABDOMEN: CPT

## 2021-12-28 PROCEDURE — 99222 1ST HOSP IP/OBS MODERATE 55: CPT | Performed by: SPECIALIST

## 2021-12-28 PROCEDURE — 83516 IMMUNOASSAY NONANTIBODY: CPT

## 2021-12-28 PROCEDURE — 80143 DRUG ASSAY ACETAMINOPHEN: CPT

## 2021-12-28 PROCEDURE — 80053 COMPREHEN METABOLIC PANEL: CPT

## 2021-12-28 PROCEDURE — 2580000003 HC RX 258: Performed by: HOSPITALIST

## 2021-12-28 PROCEDURE — A9537 TC99M MEBROFENIN: HCPCS | Performed by: SPECIALIST

## 2021-12-28 PROCEDURE — 99253 IP/OBS CNSLTJ NEW/EST LOW 45: CPT | Performed by: NURSE PRACTITIONER

## 2021-12-28 PROCEDURE — 78226 HEPATOBILIARY SYSTEM IMAGING: CPT

## 2021-12-28 PROCEDURE — C9113 INJ PANTOPRAZOLE SODIUM, VIA: HCPCS | Performed by: HOSPITALIST

## 2021-12-28 PROCEDURE — 86039 ANTINUCLEAR ANTIBODIES (ANA): CPT

## 2021-12-28 PROCEDURE — 84703 CHORIONIC GONADOTROPIN ASSAY: CPT

## 2021-12-28 PROCEDURE — 80061 LIPID PANEL: CPT

## 2021-12-28 PROCEDURE — 1210000000 HC MED SURG R&B

## 2021-12-28 PROCEDURE — 86376 MICROSOMAL ANTIBODY EACH: CPT

## 2021-12-28 PROCEDURE — 83010 ASSAY OF HAPTOGLOBIN QUANT: CPT

## 2021-12-28 PROCEDURE — 6360000002 HC RX W HCPCS: Performed by: HOSPITALIST

## 2021-12-28 PROCEDURE — 3430000000 HC RX DIAGNOSTIC RADIOPHARMACEUTICAL: Performed by: SPECIALIST

## 2021-12-28 RX ORDER — DIPHENHYDRAMINE HCL 25 MG
25 TABLET ORAL EVERY 6 HOURS PRN
Status: DISCONTINUED | OUTPATIENT
Start: 2021-12-28 | End: 2022-01-04 | Stop reason: HOSPADM

## 2021-12-28 RX ORDER — OXYCODONE AND ACETAMINOPHEN 10; 325 MG/1; MG/1
1 TABLET ORAL EVERY 6 HOURS PRN
COMMUNITY

## 2021-12-28 RX ORDER — SODIUM CHLORIDE 9 MG/ML
10 INJECTION INTRAVENOUS DAILY
Status: DISCONTINUED | OUTPATIENT
Start: 2021-12-28 | End: 2022-01-04 | Stop reason: HOSPADM

## 2021-12-28 RX ORDER — GABAPENTIN 300 MG/1
300 CAPSULE ORAL EVERY 8 HOURS SCHEDULED
COMMUNITY

## 2021-12-28 RX ORDER — MEROPENEM AND SODIUM CHLORIDE 1 G/50ML
1000 INJECTION, SOLUTION INTRAVENOUS EVERY 12 HOURS
Status: DISCONTINUED | OUTPATIENT
Start: 2021-12-28 | End: 2022-01-04 | Stop reason: HOSPADM

## 2021-12-28 RX ORDER — TIZANIDINE 4 MG/1
4 TABLET ORAL EVERY 8 HOURS PRN
COMMUNITY

## 2021-12-28 RX ORDER — PANTOPRAZOLE SODIUM 40 MG/10ML
40 INJECTION, POWDER, LYOPHILIZED, FOR SOLUTION INTRAVENOUS 2 TIMES DAILY
Status: DISCONTINUED | OUTPATIENT
Start: 2021-12-28 | End: 2022-01-04 | Stop reason: HOSPADM

## 2021-12-28 RX ORDER — HYDROMORPHONE HYDROCHLORIDE 1 MG/ML
1 INJECTION, SOLUTION INTRAMUSCULAR; INTRAVENOUS; SUBCUTANEOUS EVERY 4 HOURS PRN
Status: DISCONTINUED | OUTPATIENT
Start: 2021-12-28 | End: 2022-01-04 | Stop reason: HOSPADM

## 2021-12-28 RX ADMIN — PANTOPRAZOLE SODIUM 40 MG: 40 INJECTION, POWDER, FOR SOLUTION INTRAVENOUS at 19:37

## 2021-12-28 RX ADMIN — MEROPENEM AND SODIUM CHLORIDE 1000 MG: 1 INJECTION, SOLUTION INTRAVENOUS at 23:37

## 2021-12-28 RX ADMIN — SODIUM CHLORIDE: 9 INJECTION, SOLUTION INTRAVENOUS at 23:35

## 2021-12-28 RX ADMIN — SODIUM CHLORIDE, PRESERVATIVE FREE 10 ML: 5 INJECTION INTRAVENOUS at 08:22

## 2021-12-28 RX ADMIN — SODIUM CHLORIDE, PRESERVATIVE FREE 10 ML: 5 INJECTION INTRAVENOUS at 08:23

## 2021-12-28 RX ADMIN — PANTOPRAZOLE SODIUM 40 MG: 40 INJECTION, POWDER, FOR SOLUTION INTRAVENOUS at 08:22

## 2021-12-28 RX ADMIN — HYDROMORPHONE HYDROCHLORIDE 1 MG: 1 INJECTION, SOLUTION INTRAMUSCULAR; INTRAVENOUS; SUBCUTANEOUS at 23:35

## 2021-12-28 RX ADMIN — SODIUM CHLORIDE, PRESERVATIVE FREE 10 ML: 5 INJECTION INTRAVENOUS at 02:39

## 2021-12-28 RX ADMIN — HYDROMORPHONE HYDROCHLORIDE 1 MG: 1 INJECTION, SOLUTION INTRAMUSCULAR; INTRAVENOUS; SUBCUTANEOUS at 10:22

## 2021-12-28 RX ADMIN — ONDANSETRON 4 MG: 2 INJECTION INTRAMUSCULAR; INTRAVENOUS at 19:38

## 2021-12-28 RX ADMIN — SODIUM CHLORIDE: 9 INJECTION, SOLUTION INTRAVENOUS at 02:40

## 2021-12-28 RX ADMIN — HYDROMORPHONE HYDROCHLORIDE 1 MG: 1 INJECTION, SOLUTION INTRAMUSCULAR; INTRAVENOUS; SUBCUTANEOUS at 06:03

## 2021-12-28 RX ADMIN — HYDROMORPHONE HYDROCHLORIDE 1 MG: 1 INJECTION, SOLUTION INTRAMUSCULAR; INTRAVENOUS; SUBCUTANEOUS at 15:27

## 2021-12-28 RX ADMIN — MEROPENEM AND SODIUM CHLORIDE 1000 MG: 1 INJECTION, SOLUTION INTRAVENOUS at 14:33

## 2021-12-28 RX ADMIN — Medication 5 MILLICURIE: at 14:08

## 2021-12-28 RX ADMIN — HYDROMORPHONE HYDROCHLORIDE 1 MG: 1 INJECTION, SOLUTION INTRAMUSCULAR; INTRAVENOUS; SUBCUTANEOUS at 19:37

## 2021-12-28 ASSESSMENT — PAIN SCALES - GENERAL
PAINLEVEL_OUTOF10: 8
PAINLEVEL_OUTOF10: 2
PAINLEVEL_OUTOF10: 7
PAINLEVEL_OUTOF10: 2
PAINLEVEL_OUTOF10: 7

## 2021-12-28 ASSESSMENT — ENCOUNTER SYMPTOMS
SORE THROAT: 0
ABDOMINAL DISTENTION: 0
SINUS PAIN: 0
CHOKING: 0
TROUBLE SWALLOWING: 0
SHORTNESS OF BREATH: 0
COLOR CHANGE: 1
VOMITING: 0
DIARRHEA: 0
CONSTIPATION: 0
NAUSEA: 1
WHEEZING: 0
BLOOD IN STOOL: 0
ABDOMINAL PAIN: 1
COUGH: 0
ABDOMINAL PAIN: 0

## 2021-12-28 ASSESSMENT — PAIN DESCRIPTION - PAIN TYPE
TYPE: ACUTE PAIN

## 2021-12-28 ASSESSMENT — PAIN DESCRIPTION - LOCATION
LOCATION: ABDOMEN

## 2021-12-28 ASSESSMENT — PAIN DESCRIPTION - ONSET: ONSET: ON-GOING

## 2021-12-28 ASSESSMENT — PAIN DESCRIPTION - ORIENTATION
ORIENTATION: UPPER
ORIENTATION: UPPER

## 2021-12-28 ASSESSMENT — PAIN - FUNCTIONAL ASSESSMENT: PAIN_FUNCTIONAL_ASSESSMENT: ACTIVITIES ARE NOT PREVENTED

## 2021-12-28 ASSESSMENT — PAIN DESCRIPTION - DESCRIPTORS: DESCRIPTORS: SHARP

## 2021-12-28 ASSESSMENT — PAIN DESCRIPTION - DIRECTION: RADIATING_TOWARDS: NO

## 2021-12-28 ASSESSMENT — PAIN DESCRIPTION - PROGRESSION: CLINICAL_PROGRESSION: NOT CHANGED

## 2021-12-28 ASSESSMENT — PAIN DESCRIPTION - FREQUENCY: FREQUENCY: INTERMITTENT

## 2021-12-28 NOTE — ED PROVIDER NOTES
[facility] EMERGENCY DEPT  eMERGENCY dEPARTMENT eNCOUnter      Pt Name: Skyla Barrientos  MRN: [id number]  [place] 1980  Date of evaluation: 12/27/2021  Provider: Anuja Dover MD    CHIEF COMPLAINT       Chief Complaint   Patient presents with    Abdominal Pain    Jaundice         HISTORY OF PRESENT ILLNESS   (Location/Symptom, Timing/Onset,Context/Setting, Quality, Duration, Modifying Factors, Severity)  Note limiting factors. Skyla Barrientos is a 42 y.o. female who presents to the emergency department with jaundice x 1 day. Noted being yellow no hx. States doesn't drink much at all, last time Dec 3 rd at a [employer] party. Pt has some intermittent abd cramping at times upper abd. GB out in June. Pt with intermittent chills fever over last week, none today. No known liver issues. The history is provided by the patient. NursingNotes were reviewed. REVIEW OF SYSTEMS    (2-9 systems for level 4, 10 or more for level 5)     Review of Systems   Constitutional: Negative for diaphoresis. Respiratory: Negative for cough and choking. Cardiovascular: Negative for chest pain. Gastrointestinal: Positive for abdominal pain. Skin: Positive for color change. Neurological: Negative for numbness. Psychiatric/Behavioral: Negative for confusion. A complete review of systems was performed and is negative except as noted above in the HPI. PAST MEDICAL HISTORY   No past medical history on file. SURGICAL HISTORY     No past surgical history on file. CURRENT MEDICATIONS       Previous Medications    No medications on file       ALLERGIES     Patient has no known allergies. FAMILY HISTORY     No family history on file.        SOCIAL HISTORY       Social History     Socioeconomic History    Marital status:      Spouse name: Not on file    Number of children: Not on file    Years of education: Not on file    Highest education level: Not on file   Occupational History  Not on file   Tobacco Use    Smoking status: Not on file    Smokeless tobacco: Not on file   Substance and Sexual Activity    Alcohol use: Not on file    Drug use: Not on file    Sexual activity: Not on file   Other Topics Concern    Not on file   Social History Narrative    Not on file     Social Determinants of Health     Financial Resource Strain:     Difficulty of Paying Living Expenses: Not on file   Food Insecurity:     Worried About Running Out of Food in the Last Year: Not on file    Jane of Food in the Last Year: Not on file   Transportation Needs:     Lack of Transportation (Medical): Not on file    Lack of Transportation (Non-Medical):  Not on file   Physical Activity:     Days of Exercise per Week: Not on file    Minutes of Exercise per Session: Not on file   Stress:     Feeling of Stress : Not on file   Social Connections:     Frequency of Communication with Friends and Family: Not on file    Frequency of Social Gatherings with Friends and Family: Not on file    Attends Church Services: Not on file    Active Member of [address] [place] [address] or Organizations: Not on file    Attends Club or Organization Meetings: Not on file    Marital Status: Not on file   Intimate Partner Violence:     Fear of Current or Ex-Partner: Not on file    Emotionally Abused: Not on file    Physically Abused: Not on file    Sexually Abused: Not on file   Housing Stability:     Unable to Pay for Housing in the Last Year: Not on file    Number of [place] in the Last Year: Not on file    Unstable Housing in the Last Year: Not on file       SCREENINGS             PHYSICAL EXAM    (up to 7 for level 4, 8 or more for level 5)     ED Triage Vitals   BP Temp Temp Source Pulse Resp SpO2 Height Weight   12/27/21 1555 12/27/21 1555 12/27/21 1555 12/27/21 1555 12/27/21 1555 12/27/21 1555 -- 12/27/21 1557   (!) 153/95 98.9 °F (37.2 °C) Oral 76 18 96 %  282 lb (127.9 kg)       Physical Exam  Vitals and nursing note reviewed. Constitutional:       General: She is not in acute distress. Appearance: Normal appearance. HENT:      Head: Normocephalic and atraumatic. Eyes:      General: Scleral icterus present. Extraocular Movements: Extraocular movements intact. Pupils: Pupils are equal, round, and reactive to light. Cardiovascular:      Rate and Rhythm: Normal rate and regular rhythm. Pulmonary:      Effort: Pulmonary effort is normal. No respiratory distress. Abdominal:      General: Abdomen is flat. Palpations: Abdomen is soft. Tenderness: Tenderness: mild upper at times. There is no guarding or rebound. Musculoskeletal:         General: No tenderness. Normal range of motion. Cervical back: Normal range of motion and neck supple. Skin:     General: Skin is warm and dry. Capillary Refill: Capillary refill takes less than 2 seconds. Coloration: Skin is jaundiced. Neurological:      General: No focal deficit present. Mental Status: She is alert and oriented to person, place, and time. Psychiatric:         Mood and Affect: Mood normal.         Behavior: Behavior normal.         DIAGNOSTIC RESULTS     EKG: All EKG's are interpreted by the Emergency Department Physician who either signs or Co-signs this chart in the absence of a cardiologist.        RADIOLOGY:   Non-plain film images such as CT, Ultrasound and MRI are read by the radiologist. Sergio Gums images are visualized and preliminarily interpreted by the emergency physician with the below findings:        Interpretation per the Radiologist below, if available at the time of this note:    CT ABDOMEN PELVIS W IV CONTRAST Additional Contrast? None   Final Result   1. Mild hepatic steatosis without focal liver lesions. The gallbladder   surgically absent. No biliary ductal dilatation. 2. Mild splenomegaly. 3. Endometrium appears mildly thickened and irregular, correlate with   patient presentation.    4. 2.8 cm fat-containing left adnexal mass, ovarian dermoid   considered. GYN consultation recommended. 5. Otherwise, No CT evidence of acute intra-abdominal/pelvic   pathological process. Signed by Dr Charly Waite       [address] MRCP    (Results Pending)   US LIVER    (Results Pending)     Stat rad MRI no obstruction or stone in the ducts    ED BEDSIDE ULTRASOUND:   Performed by ED Physician - none    LABS:  Labs Reviewed   CBC WITH AUTO DIFFERENTIAL - Abnormal; Notable for the following components:       Result Value    RBC 5.56 (*)     Hematocrit 49.7 (*)     MCHC 31.6 (*)     RDW 14.7 (*)     MPV 13.0 (*)     Neutrophils % 46.0 (*)     Lymphocytes % 42.0 (*)     Eosinophils % 6.0 (*)     All other components within normal limits   COMPREHENSIVE METABOLIC PANEL - Abnormal; Notable for the following components: Total Bilirubin 5.9 (*)     Alkaline Phosphatase 226 (*)      (*)      (*)     All other components within normal limits   PROTIME-INR - Abnormal; Notable for the following components:    INR 0.86 (*)     All other components within normal limits   BILIRUBIN, DIRECT - Abnormal; Notable for the following components:    Bilirubin, Direct 4.3 (*)     All other components within normal limits   INDIRECT BILIRUBIN - Abnormal; Notable for the following components:    Bilirubin, Indirect 1.6 (*)     All other components within normal limits   COVID-19, RAPID   CULTURE, BLOOD 1   CULTURE, BLOOD 2   LIPASE   APTT   HEPATITIS PANEL, ACUTE   LACTIC ACID, PLASMA   COMPREHENSIVE METABOLIC PANEL       All other labs were within normal range or not returned as of this dictation.     EMERGENCY DEPARTMENT COURSE and DIFFERENTIALDIAGNOSIS/MDM:   Vitals:    Vitals:    12/27/21 1555 12/27/21 1557   BP: (!) 153/95    Pulse: 76    Resp: 18    Temp: 98.9 °F (37.2 °C)    TempSrc: Oral    SpO2: 96%    Weight:  282 lb (127.9 kg)       MDM  Number of Diagnoses or Management Options  Jaundice, non-  Diagnosis management comments: Pt with ? choledocolithiasis    Vs mild infection    Vs other cause of jaundice    Tried to transfer patient no ability at this time and [facility] wouldn't take her. Did MRCP to rule out obstruction needing ERCP since we don't have capability. MRCP no obstruction    Admit on abx and GI consult to further evaluate cause of jaundice. Pt understans plan. Amount and/or Complexity of Data Reviewed  Clinical lab tests: ordered and reviewed  Tests in the radiology section of CPT®: ordered and reviewed  Discuss the patient with other providers: yes    Patient Progress  Patient progress: stable        CONSULTS:  IP CONSULT TO GI    PROCEDURES:  Unless otherwise notedbelow, none     Procedures    FINAL IMPRESSION     1. Jaundice, non-          DISPOSITION/PLAN   DISPOSITION Admitted 2021 11:07:32 PM      PATIENT REFERRED TO:  No follow-up provider specified.     DISCHARGE MEDICATIONS:  New Prescriptions    No medications on file          (Please note that portions of this note were completed with a voice recognition program.  Efforts were made to edit the dictations butoccasionally words are mis-transcribed.)    Shawna Bates MD (electronically signed)  AttendingEmergency Physician          Shawna Bates MD  [phone]

## 2021-12-28 NOTE — ED NOTES
PT given 1mg ativan while in MRI at 2128.     Pt able to tolerated MRI after ativan administration     Annie Adams RN  12/27/21 2274

## 2021-12-28 NOTE — PROGRESS NOTES
Pt off the floor to nuc/med for testing. Reviewed CT scan, dermoid cyst, will see pt tomorrow.  AM. ML

## 2021-12-28 NOTE — PROGRESS NOTES
Comprehensive Nutrition Assessment    Type and Reason for Visit:  Initial,Positive Nutrition Screen    Nutrition Recommendations/Plan:   Monitor for diet advance. Nutrition Assessment:  +NS for poor appetite. Pt at risk for nutrition compromise AEB reported poor appeite PTA and current NPO diet. Will monitor or diet advance and need of nutrition intervention. Malnutrition Assessment:  Malnutrition Status: At risk for malnutrition (Comment)    Context:  Acute Illness     Findings of the 6 clinical characteristics of malnutrition:  Energy Intake:  Mild decrease in energy intake (Comment)  Weight Loss:  No significant weight loss     Body Fat Loss:  Unable to assess     Muscle Mass Loss:  Unable to assess    Fluid Accumulation:  No significant fluid accumulation     Strength:  Not Performed    Estimated Daily Nutrient Needs:  Energy (kcal):  6953-5410 kcals/day; Weight Used for Energy Requirements:  Current (12-15 kcal/kg)     Protein (g):   g/pro/day; Weight Used for Protein Requirements:  Ideal (1.5-2 g/kg)        Fluid (ml/day):  9486-7683 mL/fluid/day; Method Used for Fluid Requirements:  1 ml/kcal      Nutrition Related Findings:  jaundice       Current Nutrition Therapies:    Diet NPO    Anthropometric Measures:  · Height: 5' 5\" (165.1 cm)  · Current Body Weight: 282 lb (127.9 kg)   · Admission Body Weight: 282 lb (127.9 kg)    · Ideal Body Weight: 125 lbs  · BMI: 46.9   · BMI Categories: Obese Class 3 (BMI 40.0 or greater)       Nutrition Diagnosis:   · Inadequate oral intake related to altered GI function as evidenced by poor intake prior to admission    Nutrition Interventions:   Food and/or Nutrient Delivery:  Continue NPO,Start Oral Diet (as appropriate)  Nutrition Education/Counseling:  Education not indicated   Coordination of Nutrition Care:  Continue to monitor while inpatient    Goals:  Pt will tolerate diet advance to meet nutritional needs.        Nutrition Monitoring and Evaluation:   Food/Nutrient Intake Outcomes:  Diet Advancement/Tolerance  Physical Signs/Symptoms Outcomes:  Biochemical Data,Nutrition Focused Physical Findings,Skin,Weight,GI Status     Discharge Planning:     Too soon to determine     Electronically signed by Solitario Pino MS, RD, LD on 12/28/21 at 11:28 AM CST    Contact: [phone]

## 2021-12-28 NOTE — PLAN OF CARE
Nutrition Problem #1: Inadequate oral intake  Intervention: Food and/or Nutrient Delivery: Continue NPO,Start Oral Diet (as appropriate)  Nutritional Goals: Pt will tolerate diet advance to meet nutritional needs.     Problem: Nutrition  Goal: Optimal nutrition therapy  Outcome: Ongoing

## 2021-12-28 NOTE — PROGRESS NOTES
Medication that may have an adverse reaction to the liver    hepatic failure:    Acetaminophen; Oxycodone  Meropenem  Ondansetron  Pantoprazole  Tizanidine    hepatic necrosis:    Acetaminophen; Oxycodone  Tizanidine    Hepatitis:    acetaminophen; Oxycodone  Gabapentin  Pantoprazole  Tizanidine    Hepatotoxicity:    Acetaminophen, oxycodone

## 2021-12-28 NOTE — PROGRESS NOTES
Jordy Martinez arrived to room # 503. Presented with: jaundice  Mental Status: Patient is oriented, alert, coherent, logical, thought processes intact and able to concentrate and follow conversation. Vitals:    12/28/21 0154   BP: 128/74   Pulse: 80   Resp: 16   Temp: 97 °F (36.1 °C)   SpO2: 97%     Patient safety contract and falls prevention contract reviewed with patient No.  Oriented Patient to room. Call light within reach. Yes.   Needs, issues or concerns expressed at this time: no.      Electronically signed by Jj Ernandez RN on 12/28/2021 at 2:02 AM

## 2021-12-28 NOTE — CONSULTS
Department of Gynecology  Nurse Practitioner Consult Note      Reason for Consult:  Ovarian cyst  Requesting Physician:  Sidra Peabody, MD    CHIEF COMPLAINT:   NV,  Abdominal pain x1 week    History obtained from patient    HISTORY OF PRESENT ILLNESS:                   The patient is a 42 y.o. female who presents to the ER with significant nausea, vomiting and epigastric pain x1 week. Started feeling more fatigued and noticed yellowing of her eyes. Past Medical History:    No past medical history on file. Past Surgical History:    History reviewed. No pertinent surgical history. Past Gynecological History:    1. Last menstrual period:  1-2 weeks ago  2. Menses: interval:  4 weeks  3. Contraception: Essure  4. Sexually transmitted disease history: none        A. Number of sexual partners in the last 6 months: 1    5. Pap History: Last PAP was normal; [facility]      6. Date of last mammogram: Patient has never had a mammogram.    OB History   No obstetric history on file. Medications Prior to Admission:   Medications Prior to Admission: oxyCODONE-acetaminophen (PERCOCET)  MG per tablet, Take 1 tablet by mouth every 6 hours as needed for Pain.  gabapentin (NEURONTIN) 300 MG capsule, Take 300 mg by mouth every 8 hours. tiZANidine (ZANAFLEX) 4 MG tablet, Take 4 mg by mouth every 8 hours as needed (spasms)    Allergies:  Patient has no known allergies. Social History:  TOBACCO:  Never used tobacco  ETOH:  Never drank alcohol  DRUGS:  Never used recreational drugs  SEXUAL HISTORY:  Sexually Active:  Yes,  for 20 years    Family History:   No family history on file.     REVIEW OF SYSTEMS:      Constitutional:  positive for  fatigue  Eyes:  negative  Ears, nose, mouth, throat, and face:  negative  Respiratory:  negative  Cardiovascular:  negative  Gastrointestinal:  positive for nausea, vomiting, abdominal pain, pruritus and jaundice  Genitourinary: negative  Integument/breast:  negative  Hematologic/lymphatic:  negative  Allergic/Immunologic:  negative  Endocrine:  negative  Musculoskeletal:  negative  Neurological:  negative  Behavior/Psych:  negative    PHYSICAL EXAM:    Vitals:  /71   Pulse 75   Temp 97.7 °F (36.5 °C) (Temporal)   Resp 18   Ht 5' 5\" (1.651 m)   Wt 282 lb (127.9 kg)   SpO2 96%   BMI 46.93 kg/m²     CONSTITUTIONAL:  fatigued  EYES:  lids and lashes normal  ENT:  Normocephalic, without obvious abnormality, atraumatic, sinuses nontender on palpation, external ears without lesions, oral pharynx with moist mucus membranes, tonsils without erythema or exudates, gums normal and good dentition. NECK:  supple, symmetrical, trachea midline and skin normal  MUSCULOSKELETAL:  There is no redness, warmth, or swelling of the joints. Full range of motion noted. Motor strength is 5 out of 5 all extremities bilaterally. Tone is normal.  NEUROLOGIC:  Awake, alert, oriented to name, place and time. Cranial nerves II-XII are grossly intact. Motor is 5 out of 5 bilaterally and gait is normal.  SKIN:  no bruising or bleeding    DATA:  Impression   1. Mild hepatic steatosis without focal liver lesions. The gallbladder   surgically absent. No biliary ductal dilatation. 2. Mild splenomegaly. 3. Endometrium appears mildly thickened and irregular, correlate with   patient presentation. 4. 2.8 cm fat-containing left adnexal mass, ovarian dermoid   considered. GYN consultation recommended. 5. Otherwise, No CT evidence of acute intra-abdominal/pelvic   pathological process. Signed by Dr Lakisha Yeung         IMPRESSION/RECOMMENDATIONS:        Adnexal mass    Discussed probable dermoid cyst on left ovary and can f/u in office after hospital discharge for exam and TVUS. Correlate endometrium with premenopausal status and midcycle changes. Pt reports regular, monthly periods, somewhat heavy and without problems.  Will further characterize with TVUS and exam. Pt states understanding. Thank you for the consult.

## 2021-12-28 NOTE — PROGRESS NOTES
[facility]      Patient:  Reatha Libman  YOB: 1980  Date of Service: 12/28/2021  MRN: [id number]   Acct: [de-identified]   Primary Care Physician: Sedrick Padilla MD  Advance Directive: Full Code  Admit Date: 12/27/2021       Hospital Day: 1  Portions of this note have been copied forward, however, changed to reflect the most current clinical status of this patient. CHIEF COMPLAINT Jaundice    SUBJECTIVE: 43 yo female admitted on 12/27/2021.  1 day of jaundice with noticeable scleral icteri. Associated abdominal cramping and nausea. Denies any previous hepatic hx. Denies heavy ETOH use, occasional mixed drink. States that PCP has told her in the past that she had mild elevation of LFT's due to \"fatty liver\". Cholecystectomy in June due to cholelithiasis-IOC was unremarkable per surgeon's note. To be noted that a liver biopsy was obtained at that time that showed mild steatosis. 12/28/21-C/O mild nausea and poor appetite. Denies abdominal pain. C/O itching to palms and plantar surfaces. Remains afebrile. Review of Systems:   Review of Systems   Constitutional: Positive for fatigue. Negative for chills, diaphoresis and fever. HENT: Negative for congestion, ear pain, sinus pain, sore throat and trouble swallowing. Eyes: Negative for visual disturbance. Respiratory: Negative for cough, shortness of breath and wheezing. Cardiovascular: Negative for chest pain, palpitations and leg swelling. Gastrointestinal: Positive for nausea. Negative for abdominal distention, abdominal pain, blood in stool, constipation, diarrhea and vomiting. Endocrine: Negative for cold intolerance and heat intolerance. Genitourinary: Negative for difficulty urinating, flank pain, frequency and urgency. Musculoskeletal: Negative for arthralgias and myalgias. Skin: Positive for color change. Neurological: Negative for dizziness, syncope, weakness, light-headedness, numbness and headaches. Hematological: Does not bruise/bleed easily. Psychiatric/Behavioral: Negative for agitation, confusion and dysphoric mood. 14 point review of systems is negative except as specifically addressed above. Objective:   VITALS:  /71   Pulse 75   Temp 97.7 °F (36.5 °C) (Temporal)   Resp 18   Ht 5' 5\" (1.651 m)   Wt 282 lb (127.9 kg)   SpO2 96%   BMI 46.93 kg/m²   24HR INTAKE/OUTPUT:      Intake/Output Summary (Last 24 hours) at 12/28/2021 1513  Last data filed at 12/28/2021 0058  Gross per 24 hour   Intake 50 ml   Output    Net 50 ml       Physical Exam  Constitutional:       General: She is not in acute distress. Appearance: Normal appearance. She is obese. She is not toxic-appearing or diaphoretic. HENT:      Head: Normocephalic and atraumatic. Right Ear: External ear normal.      Left Ear: External ear normal.      Nose: Nose normal. No congestion or rhinorrhea. Mouth/Throat:      Mouth: Mucous membranes are moist.      Pharynx: Oropharynx is clear. Eyes:      General: Scleral icterus present. Extraocular Movements: Extraocular movements intact. Conjunctiva/sclera: Conjunctivae normal.   Cardiovascular:      Rate and Rhythm: Normal rate and regular rhythm. Pulses: Normal pulses. Heart sounds: Normal heart sounds. No murmur heard. No friction rub. No gallop. Pulmonary:      Effort: Pulmonary effort is normal. No respiratory distress. Breath sounds: Normal breath sounds. No wheezing, rhonchi or rales. Abdominal:      General: Abdomen is flat. Bowel sounds are normal. There is distension (Softly). Palpations: Abdomen is soft. Tenderness: There is no abdominal tenderness. Musculoskeletal:         General: No swelling. Normal range of motion. Cervical back: Normal range of motion and neck supple. Right lower leg: No edema. Left lower leg: No edema. Skin:     General: Skin is warm and dry.       Coloration: Skin is jaundiced. Findings: No erythema, lesion or rash. Neurological:      General: No focal deficit present. Mental Status: She is alert and oriented to person, place, and time. Mental status is at baseline. Cranial Nerves: No cranial nerve deficit. Sensory: No sensory deficit. Motor: No weakness. Psychiatric:         Mood and Affect: Mood normal.         Behavior: Behavior normal.         Thought Content: Thought content normal.         Judgment: Judgment normal.             Medications:      sodium chloride 75 mL/hr at 12/28/21 0240    sodium chloride        meropenem  1,000 mg IntraVENous Q12H    pantoprazole  40 mg IntraVENous BID    And    sodium chloride (PF)  10 mL IntraVENous Daily    sodium chloride flush  5-40 mL IntraVENous 2 times per day    [Held by provider] enoxaparin  40 mg SubCUTAneous Daily     HYDROmorphone, diphenhydrAMINE, diphenhydrAMINE-zinc acetate, sodium chloride flush, sodium chloride, ondansetron **OR** ondansetron, polyethylene glycol  Diet NPO     Lab and other Data:     Recent Labs     12/27/21  1548   WBC 8.2   HGB 15.7        Recent Labs     12/27/21  1548 12/28/21  0256    138   K 4.2 4.4    103   CO2 24 22   BUN 9 8   CREATININE 0.5 0.6   GLUCOSE 109 93     Recent Labs     12/27/21  1548 12/28/21  0256   * 141*   * 189*   BILITOT 5.9* 6.8*   ALKPHOS 226* 204*     Troponin T: No results for input(s): TROPONINI in the last 72 hours. Pro-BNP: No results for input(s): BNP in the last 72 hours. INR:   Recent Labs     12/27/21  1948   INR 0.86*       RAD:   CT ABDOMEN PELVIS W IV CONTRAST Additional Contrast? None    Result Date: 12/27/2021  1. Mild hepatic steatosis without focal liver lesions. The gallbladder surgically absent. No biliary ductal dilatation. 2. Mild splenomegaly. 3. Endometrium appears mildly thickened and irregular, correlate with patient presentation.  4. 2.8 cm fat-containing left adnexal mass, ovarian dermoid considered. GYN consultation recommended. 5. Otherwise, No CT evidence of acute intra-abdominal/pelvic pathological process. Signed by Dr Rene Chino LIVER    Result Date: 12/28/2021  Unremarkable hepatic ultrasound. No biliary ductal dilatation. Evidence of previous cholecystectomy. Signed by Dr Jessica White. Thanh    MRI ABDOMEN WO CONTRAST MRCP    Result Date: 12/28/2021  No acute abnormality, no intrahepatic or extrahepatic biliary ductal dilatation and no obvious choledocholithiasis. There is previous cholecystectomy. Normal appearance of the liver. A preliminary report [facility]. There is no significant discrepancy with the preliminary report. Signed by Dr Jessica White. Thanh           Assessment/Plan   Active Problems:    Jaundice   -Trend labs-specifically Alk phos, ALT, AST and bili (Down trending)   -Autoimmune hepatitis labs pending   -Await GI recommendations ? ?? Transfer vs. Dr. Fabricio Costello for ERCP pending HIDA results   -AM labs   -Prn Benadryl cream for itching       Adnexal mass   -OB/GYN consulted-to see in AM   -Recommendations appreciated    Resolved Problems:    * No resolved hospital problems.  *       Antibiotic: Meropenem    DVT Prophylaxis:SCD's    GI prophylaxis: Protonix    Disposition: TBD    DAVID Barrios, 12/28/2021 3:13 PM

## 2021-12-28 NOTE — CONSULTS
Consult Note            Date:12/28/2021        Patient Name:Anju Ferrell     YOB: 1980     Age:41 y.o. Inpatient consult to GI  Consult performed by: Patrick Landin MD  Consult ordered by: Marylou Rojas MD          Chief Complaint     Chief Complaint   Patient presents with    Abdominal Pain    Jaundice      Chaperone Jd Pulido RN    History Obtained From   patient, electronic medical record    History of Present Illness   The patient is 50-year-old lady. I was asked to see her for abdominal pain and jaundice. Ultrasound of the abdomen, CT scan of the abdomen and MRI were done. The imaging showed fatty liver. No biliary dilation. No CBD stones. Laparoscopic cholecystectomy intraoperative cholangiogram on June 29, 2021. Liver biopsy showed mild steatosis. No periportal fibrosis. No cirrhosis. Intraoperative cholangiogram showed questionable filling defect. Patient did well since her cholecystectomy until about a week ago when she developed upper abdominal pain with radiation to the back. Pain was accompanied with nausea and vomiting. Whenever she would throw up she would have retrosternal chest pain. Patient noticed recently that she was jaundiced. She has been having fever. She denies family history liver disease. She has been taking 3 Percocet a day for a long time. She is also on Zanaflex which she has been taking for long time. She denies alcohol abuse. She denies family history liver disease. She denies taking any new medication. She denied overdosing herself on Percocet. She usually takes about 3 Percocet a day for long time. In addition to change in color of her eyes she also noted itching. She denies hematemesis, melena or hematochezia. Family history: She denies family history of liver disease. Review of systems: Review of systems positive for nausea, vomiting, abdominal pain, fever, chills, jaundice.   Review of systems negative for headache, double vision, blurred vision, cough, short of breath, wheezing, hematuria, pyuria. Summary: Current medications:    Meropenem, Lovenox held by provider, Protonix 40 mg IV twice daily. Current continuous medications: Sodium chloride. Current as needed medications: Benadryl, Dilaudid, Zofran, GlycoLax. Chart review:    12/27/2021: This visit: Current visit: Jaundice. 6/29/2021: Anesthesia event with general surgery. 6/29/2021: Laparoscopic cholecystectomy and cholangiogram laparoscopic liver biopsy. 6/29/2021: Encounter with general surgery: Fatty liver, cholelithiasis, acute on chronic cholecystitis without obstruction. Past Medical History   No past medical history on file. Past Surgical History   No past surgical history on file. Laparoscopic cholecystectomy, intraoperative cholangiogram and liver biopsy on 6/29/2021. Medications     Prior to Admission medications    Medication Sig Start Date End Date Taking? Authorizing Provider   oxyCODONE-acetaminophen (PERCOCET)  MG per tablet Take 1 tablet by mouth every 6 hours as needed for Pain. Yes Historical Provider, MD   gabapentin (NEURONTIN) 300 MG capsule Take 300 mg by mouth every 8 hours.    Yes Historical Provider, MD   tiZANidine (ZANAFLEX) 4 MG tablet Take 4 mg by mouth every 8 hours as needed (spasms)   Yes Historical Provider, MD        HYDROmorphone HCl PF (DILAUDID) injection 1 mg, Q4H PRN  meropenem (MERREM) 1000 mg in sodium chloride 0.9% 50 mL (duplex), Q12H  diphenhydrAMINE (BENADRYL) tablet 25 mg, Q6H PRN  diphenhydrAMINE-zinc acetate (BENADRYL) cream, TID PRN  pantoprazole (PROTONIX) injection 40 mg, BID   And  sodium chloride (PF) 0.9 % injection 10 mL, Daily  0.9 % sodium chloride infusion, Continuous  sodium chloride flush 0.9 % injection 5-40 mL, 2 times per day  sodium chloride flush 0.9 % injection 5-40 mL, PRN  0.9 % sodium chloride infusion, PRN  [Held by provider] enoxaparin (LOVENOX) injection 40 mg, Daily  ondansetron (ZOFRAN-ODT) disintegrating tablet 4 mg, Q8H PRN   Or  ondansetron (ZOFRAN) injection 4 mg, Q6H PRN  polyethylene glycol (GLYCOLAX) packet 17 g, Daily PRN        Allergies   Patient has no known allergies. Social History     Social History    None  Call abuse. Family History   No family history on file. No liver disease. Review of Systems   Review of Systems     I did complete review of systems. Review of systems as per HPI and as above. Rest of the review of systems and all other body systems is negative. Physical Exam   /71   Pulse 75   Temp 97.7 °F (36.5 °C) (Temporal)   Resp 18   Ht 5' 5\" (1.651 m)   Wt 282 lb (127.9 kg)   SpO2 96%   BMI 46.93 kg/m²      Physical Exam     Patient conscious, alert and oriented. Moderately built and nourished. In no acute physical distress. HEENT normocephalic. Pupils reacting to light. Neck supple no JVD. Abdomen obese, soft, nontender without any gross organomegaly, masses or tense ascites. No pedal edema. Skin unable to see jaundice because of artificial light  Neuro alert and oriented. Labs    CBC:  Recent Labs     12/27/21  1548   WBC 8.2   RBC 5.56*   HGB 15.7   HCT 49.7*   MCV 89.4   RDW 14.7*        CHEMISTRIES:  Recent Labs     12/27/21  1548 12/28/21  0256    138   K 4.2 4.4    103   CO2 24 22   BUN 9 8   CREATININE 0.5 0.6   GLUCOSE 109 93     PT/INR:  Recent Labs     12/27/21 1948   PROTIME 12.0   INR 0.86*     APTT:  Recent Labs     12/27/21 1948   APTT 27.4     LIVER PROFILE:  Recent Labs     12/27/21  1548 12/28/21  0256   * 141*   * 189*   BILIDIR 4.3*  --    BILITOT 5.9* 6.8*   ALKPHOS 226* 204*     12/28/2021: Alkaline phosphatase 204, , , bili 6.8    12/27/2021: Alkaline phos of 226, , , total bili 5.9, direct bili 4.3, indirect bili 1.6.     Imaging/Diagnostics   CT ABDOMEN PELVIS W IV CONTRAST Additional Contrast? None    Result Date: 12/27/2021    1. Mild hepatic steatosis without focal liver lesions. The gallbladder surgically absent. No biliary ductal dilatation. 2. Mild splenomegaly. 3. Endometrium appears mildly thickened and irregular, correlate with patient presentation. 4. 2.8 cm fat-containing left adnexal mass, ovarian dermoid considered. GYN consultation recommended. 5. Otherwise, No CT evidence of acute intra-abdominal/pelvic pathological process. Signed by Dr Joaquin Gómez LIVER    Result Date: 12/28/2021    Unremarkable hepatic ultrasound. No biliary ductal dilatation. Evidence of previous cholecystectomy. Signed by Dr Kecia Flowers. Thanh    MRI ABDOMEN WO CONTRAST MRCP    Result Date: 12/28/2021    No acute abnormality, no intrahepatic or extrahepatic biliary ductal dilatation and no obvious choledocholithiasis. There is previous cholecystectomy. Normal appearance of the liver. A preliminary report was provided by the [facility] include 234 beds at the [facility] radiology service. There is no significant discrepancy with the preliminary report. Signed by Dr Kecia Flowers. Thanh      12/27/2021: INR 0.86.  12/27/2021: WBC 8.2, hemoglobin 15.7, platelet count 363.    12/27/2021: CT abdomen and pelvis with IV contrast: Mild hepatic steatosis. No focal liver lesion. Gallbladder surgically absent. Mild splenomegaly. Endometrium thickened. 2.8 cm fat-containing left adnexal mass. Radiologist suggested GYN evaluation. 12/27/2021 MRI: Gallbladder surgically absent. Liver homogenous. No mass. No significant hepatic steatosis. MRCP images show no biliary ductal dilation or obvious choledocholithiasis. CBD 6 mm. Smooth tapering of the common bileduct. Spleen is more generous and normal in size. Pancreas normal.  No pancreatic ductal dilation. Hospital problems: Jaundice, adnexal mass. Medical history: Nothing documented. Surgical history: Cholecystectomy. Family history: Nothing documented.     Current medications: Benadryl, Lovenox, Dilaudid, meropenem, Zofran, Protonix, GlycoLax. Prior to admission medications: Percocet, Neurontin, tizanidine or Zanaflex. Care everywhere tab review:    6/29/2021: Laparoscopic cholecystectomy at [facility]. Laparoscopic cholecystectomy intraoperative cholangiogram.  Postop diagnosis was chronic cholecystitis, cholelithiasis and fatty liver. Pathology showed chronic cholecystitis with cholelithiasis. Liver core biopsy: Mild steatosis. No significant portal inflammation. Trichrome stain negative for periportal fibrosis/cirrhosis. Iron stain negative. Assessment      Hospital Problems           Last Modified POA    Jaundice 12/27/2021 Yes        Total bili 6.8-5.9. Direct bili 4.3. Patient has direct hyperbilirubinemia. Indirect bilirubin is low. Therefore, I do not think this is due to hemolysis. Direct hyperbilirubinemia could be due to intrahepatic cholestasis. MRCP can miss sludge or small stones. Serology for acute hepatitis A, B, C- on 12/27/2021. PD 5.3 mm by ultrasound. No dilation of biliary tree by MRI or by CAT scan. Biopsy on 6/29/2021 showed mild steatosis. No significant portal inflammation or periportal fibrosis. 6/29/2021 : Intraoperative cholangiogram:    Intraoperative fluoroscopic images during operating room cholangiogram   the common duct is visualized. Contrast is present in the duodenum and   pancreatic duct. The common duct is not dilated. Subtle filling defect   in the distal common duct is noted cannot be determined if this is   debris in the common duct or an air bubble. .     Please refer to the operative note for more details. This report was finalized on 06/29/2021 08:48 by Dr. Mikala Cordero MD.      6/23/2021: LFTs at [facility] were normal.    Talked with Shannan Boles, pharmacist to review patient's home medications and see if any of those medications can cause jaundice.   Patient prior to admission medications were Percocet, gabapentin and Zanaflex. 6/29/2021: Laparoscopic cholecystectomy and intraoperative cholangiogram    6/29/2021: Liver biopsy: Fatty liver    Fatty liver on CT but not by MRI. Normal liver on MRI. Normal biliary tree on MRI. No choledocholithiasis by MRI. Mild splenomegaly by CAT scan but not by MRI. 2.8 cm fat-containing left adnexal mass. Radiologist has suggested GYN evaluation. We also suggest GYN evaluation as per hospitalist.    Impression:    Since liver biopsy did not show any significant liver disease patient's LFTs were normal in June 2021, and since intraoperative cholangiogram could not rule out subtle distal filling defect, possibilities still exist there may be sludge in the common bile duct. Also drug-induced hepatitis also a possibility. I do not think patient has autoimmune hepatitis because liver biopsy did not show any autoimmune hepatitis. We know MRCP can miss sludge or small stones. Plan     Monitor LFTs. Patient may need ERCP. I do not do ERCP. Dr. Diego Magallon is only one  who does ERCP. I checked with Lou Olson, endoscopy nurse in charge. Dr. Cinthia Samaniego will be coming back next Monday which would be in 6 days. HIDA scan. It may or may not improve CBD stone but it may prove CBD obstruction. This is only test we can do when the ERCP stent is not available. Suggest transfer patient to another facility where ERCP city is available. Electronically signed by Barbi Cleveland MD on 12/28/21 at 8:45 AM CST    Addendum: 12/28/2021: 10:04 a.m. Only other medications patient has been taking his Benadryl as needed and Advil PM as needed. She denied taking any herbal medications, complement medication, supplementary medication, natural medication or similar medications. Addendum: 12/28/2021: 10:16 AM:    PharmacistRoberth called back. He reviewed patient medication. Zanaflex can cause liver toxicity after 6%. Percocet up to 1%. Gabapentin up to 2%.   At this time this looks likes patient does not have drug-induced but respiratory. She probably has sludge in the common bile duct or small stones being missed by MRCP. She is being covered with the meropenem. She is feeling better. She is awaiting HIDA scan. Dr. Comfort Torres who does ERCP will be coming back to town on coming Wednesday.

## 2021-12-28 NOTE — PROGRESS NOTES
4 Eyes Skin Assessment    Magdy Dove is being assessed upon: Admission    I agree that I, Coral Jeffrey, RN, along with Rhonda Panchal RN (either 2 RN's or 1 LPN and 1 RN) have performed a thorough Head to Toe Skin Assessment on the patient. ALL assessment sites listed below have been assessed. Areas assessed by both nurses:     [x]   Head, Face, and Ears   [x]   Shoulders, Back, and Chest  [x]   Arms, Elbows, and Hands   [x]   Coccyx, Sacrum, and Ischium  [x]   Legs, Feet, and Heels    Does the Patient have Skin Breakdown?  No    Melecio Prevention initiated: No  Wound Care Orders initiated: NA    [facility] nurse consulted for Pressure Injury (Stage 3,4, Unstageable, DTI, NWPT, and Complex wounds) and New or Established Ostomies: NA        Primary Nurse eSignature: Stephane Juarez RN on 12/28/2021 at 2:03 AM      Co-Signer eSignature: Electronically signed by Rhonda Panchal RN on 12/28/21 at 2:49 AM CST

## 2021-12-28 NOTE — H&P
History and Physical    Patient Name:  Catie Palomo    :  1980    Chief Complaint:   N/V for one week    History of Present Illness:   Catie Palomo presents to [facility] with one week history of non bilous and non blood vomiting associated with abdominal pain. Pain is epigastric, burning, constant, 10 out 10, OTC antiacid helped some. Denies fever or chills. No diarrhea. Today notices yellow eyes. Has been very lethargic for one week. Took covid booster in October. Denies any other symptoms except itchy soles. Past Medical History:  Chronic back pain    Surgical History:  none    Social History:   does not smoke or drink alcohol     Family History: Mother HTN    Medications:  Prior to Admission medications    Medication Sig Start Date End Date Taking? Authorizing Provider   oxyCODONE-acetaminophen (PERCOCET)  MG per tablet Take 1 tablet by mouth every 6 hours as needed for Pain. Yes Historical Provider, MD   gabapentin (NEURONTIN) 300 MG capsule Take 300 mg by mouth every 8 hours. Yes Historical Provider, MD   tiZANidine (ZANAFLEX) 4 MG tablet Take 4 mg by mouth every 8 hours as needed (spasms)   Yes Historical Provider, MD       Allergies:  Patient has no known allergies. Review of Systems:   · Constitutional: there has been no unanticipated weight loss. There's been change in energy level, activity level. · Eyes: No visual changes or diplopia. scleral icterus. · ENT: No Headaches, hearing loss or vertigo. No mouth sores or sore throat. · Cardiovascular: No chest pain, palpitations or loss of consciousness. No pleuritic pain or phlebitis. No orthopnea, PND or peripheral edema. · Respiratory: No cough or wheezing, no sputum production. No hemoptysis. No dyspnea     · Gastrointestinal: abdominal pain, appetite loss, N/V. No blood per rectum. · Genitourinary: No dysuria, trouble voiding, or hematuria.   · Musculoskeletal:  No gait disturbance, weakness or joint complaints. · Integumentary: No rash, has pruritis. · Neurological: No headache, diplopia, change in muscle strength, numbness or tingling. No change in gait, balance, coordination. · Psychiatric: No anxiety, or depression. · Endocrine: No temperature intolerance. No excessive thirst, fluid intake, or urination. No tremor. · Hematologic/Lymphatic: No abnormal bruising or bleeding, blood clots or swollen lymph nodes. · Allergic/Immunologic: No nasal congestion or hives. Physical Examination:    Vital Signs: /74   Pulse 80   Temp 97 °F (36.1 °C) (Temporal)   Resp 16   Ht 5' 5\" (1.651 m)   Wt 282 lb (127.9 kg)   SpO2 97%   BMI 46.93 kg/m²   General appearance: Well preserved, mesomorphic body habitus, alert, no distress. Skin: Skin color, texture, turgor normal. No rashes or lesions. No induration or tightening palpated. Head: Normocephalic. No masses, lesions, tenderness or abnormalities  Eyes: conjunctivae/corneas clear. EOM's intact. Sclera icteric. Ears: External ears normal.  Hearing normal to finger rub. Nose/Sinuses: Nares normal. Septum midline. Mucosa normal. No drainage or sinus tenderness. Oropharynx: Lips, mucosa, and tongue normal. Oropharynx clear with no exudate seen. Neck: Neck supple, and symmetric. No adenopathy. Trachea is midline. Carotids brisk in upstroke without bruits, No abnormal JVP noted at 45°. Lungs: Lungs clear to auscultation bilaterally. No retractions or use of accessory muscles. No vocal fremitus. No ronchi, crackle or rale. Heart:  S1 > S2. Regular rate and rhythm. No gallop, murmur, rub, palpable thrill or heave noted. Abdomen: Abdomen soft, tender in epigastric area. BS normal. No masses, organomegaly. No hernia noted. Extremities: Extremities normal. No deformities, edema, or skin discoloration. No cyanosis or clubbing noted to the nails. Peripheral pulses 4/4. Musculoskeletal: Spine ROM normal. Muscular strength intact.   Neuro: Cranial nerves intact. Motor: Strength 5/5 in all extremities. No focal weakness. Sensory: grossly normal to touch. Pertinent Labs:  CBC:   Recent Labs     12/27/21  1548   WBC 8.2   RBC 5.56*   HGB 15.7   HCT 49.7*   MCV 89.4   MCH 28.2   MCHC 31.6*   RDW 14.7*      MPV 13.0*     BMP:   Recent Labs     12/27/21  1548      K 4.2      CO2 24   BUN 9   CREATININE 0.5   GLUCOSE 109   CALCIUM 10.0     ABGs: No results for input(s): PO2, PCO2, PH, HCO3, BE, O2SAT in the last 72 hours. INR:   Recent Labs     12/27/21  1948   INR 0.86*   PROTIME 12.0     BNP:  No results found for: BNP  TSH: No results found for: TSH  Cardiac Injury Profile: No results found for: CKTOTAL, CKMB, CKMBINDEX, TROPONINI  Lipid Profile: No components found for: CHLPL  No results found for: TRIG  No results found for: HDL  No results found for: LDLCALC  No results found for: LABVLDL  Hemoglobin A1C: No results found for: LABA1C  No results found for: EAG      Assessment/Plan:  · Jaundice with hyperbilirubinemia and elevated LFT- MRCP neg- US liver pending - GI consulted - recommended IV ab  · N/V- zofran prn  · pruritus benadryl prn  · Chronic back pain - iv dilaudid, avoid tylenol  · Thickened Endometrium and irregular with 2.8 cm fat-containing left adnexal mass- consider GYN consultation                 I have reviewed my findings and recommendations in detail with Deysi Singh. Eunice Osullivan MD       Addendum   Dr Ray Flores from ER discussed this case with Dr Esther Martinez, GI specialist who said this patient DOES NOT NEED ERCP.

## 2021-12-29 LAB
ALBUMIN SERPL-MCNC: 3.4 G/DL (ref 3.5–5.2)
ALP BLD-CCNC: 229 U/L (ref 35–104)
ALT SERPL-CCNC: 188 U/L (ref 5–33)
ANION GAP SERPL CALCULATED.3IONS-SCNC: 12 MMOL/L (ref 7–19)
AST SERPL-CCNC: 164 U/L (ref 5–32)
BASOPHILS ABSOLUTE: 0 K/UL (ref 0–0.2)
BASOPHILS RELATIVE PERCENT: 0.5 % (ref 0–1)
BILIRUB SERPL-MCNC: 8.3 MG/DL (ref 0.2–1.2)
BILIRUBIN DIRECT: 7.4 MG/DL (ref 0–0.3)
BILIRUBIN, INDIRECT: 0.9 MG/DL (ref 0.1–1)
BUN BLDV-MCNC: 11 MG/DL (ref 6–20)
CALCIUM SERPL-MCNC: 9 MG/DL (ref 8.6–10)
CHLORIDE BLD-SCNC: 105 MMOL/L (ref 98–111)
CO2: 22 MMOL/L (ref 22–29)
CREAT SERPL-MCNC: 0.5 MG/DL (ref 0.5–0.9)
EOSINOPHILS ABSOLUTE: 0.4 K/UL (ref 0–0.6)
EOSINOPHILS RELATIVE PERCENT: 6.1 % (ref 0–5)
GFR AFRICAN AMERICAN: >59
GFR NON-AFRICAN AMERICAN: >60
GLUCOSE BLD-MCNC: 85 MG/DL (ref 74–109)
HCT VFR BLD CALC: 41 % (ref 37–47)
HEMOGLOBIN: 13 G/DL (ref 12–16)
IMMATURE GRANULOCYTES #: 0 K/UL
LYMPHOCYTES ABSOLUTE: 2.1 K/UL (ref 1.1–4.5)
LYMPHOCYTES RELATIVE PERCENT: 33.5 % (ref 20–40)
MCH RBC QN AUTO: 28.2 PG (ref 27–31)
MCHC RBC AUTO-ENTMCNC: 31.7 G/DL (ref 33–37)
MCV RBC AUTO: 88.9 FL (ref 81–99)
MONOCYTES ABSOLUTE: 0.5 K/UL (ref 0–0.9)
MONOCYTES RELATIVE PERCENT: 7.8 % (ref 0–10)
NEUTROPHILS ABSOLUTE: 3.3 K/UL (ref 1.5–7.5)
NEUTROPHILS RELATIVE PERCENT: 51.9 % (ref 50–65)
PDW BLD-RTO: 15.1 % (ref 11.5–14.5)
PLATELET # BLD: 120 K/UL (ref 130–400)
PMV BLD AUTO: 12.7 FL (ref 9.4–12.3)
POTASSIUM SERPL-SCNC: 4.6 MMOL/L (ref 3.5–5)
RBC # BLD: 4.61 M/UL (ref 4.2–5.4)
SODIUM BLD-SCNC: 139 MMOL/L (ref 136–145)
TOTAL PROTEIN: 5.7 G/DL (ref 6.6–8.7)
WBC # BLD: 6.4 K/UL (ref 4.8–10.8)

## 2021-12-29 PROCEDURE — C9113 INJ PANTOPRAZOLE SODIUM, VIA: HCPCS | Performed by: HOSPITALIST

## 2021-12-29 PROCEDURE — 6360000002 HC RX W HCPCS: Performed by: HOSPITALIST

## 2021-12-29 PROCEDURE — 82248 BILIRUBIN DIRECT: CPT

## 2021-12-29 PROCEDURE — 1210000000 HC MED SURG R&B

## 2021-12-29 PROCEDURE — 85025 COMPLETE CBC W/AUTO DIFF WBC: CPT

## 2021-12-29 PROCEDURE — 80053 COMPREHEN METABOLIC PANEL: CPT

## 2021-12-29 PROCEDURE — 36415 COLL VENOUS BLD VENIPUNCTURE: CPT

## 2021-12-29 PROCEDURE — 99232 SBSQ HOSP IP/OBS MODERATE 35: CPT | Performed by: SPECIALIST

## 2021-12-29 PROCEDURE — 2580000003 HC RX 258: Performed by: HOSPITALIST

## 2021-12-29 RX ORDER — DIAPER,BRIEF,INFANT-TODD,DISP
EACH MISCELLANEOUS 3 TIMES DAILY PRN
Status: DISCONTINUED | OUTPATIENT
Start: 2021-12-29 | End: 2022-01-04 | Stop reason: HOSPADM

## 2021-12-29 RX ADMIN — PANTOPRAZOLE SODIUM 40 MG: 40 INJECTION, POWDER, FOR SOLUTION INTRAVENOUS at 20:24

## 2021-12-29 RX ADMIN — SODIUM CHLORIDE, PRESERVATIVE FREE 10 ML: 5 INJECTION INTRAVENOUS at 10:50

## 2021-12-29 RX ADMIN — ONDANSETRON 4 MG: 2 INJECTION INTRAMUSCULAR; INTRAVENOUS at 03:31

## 2021-12-29 RX ADMIN — HYDROMORPHONE HYDROCHLORIDE 1 MG: 1 INJECTION, SOLUTION INTRAMUSCULAR; INTRAVENOUS; SUBCUTANEOUS at 03:31

## 2021-12-29 RX ADMIN — HYDROMORPHONE HYDROCHLORIDE 1 MG: 1 INJECTION, SOLUTION INTRAMUSCULAR; INTRAVENOUS; SUBCUTANEOUS at 07:34

## 2021-12-29 RX ADMIN — SODIUM CHLORIDE, PRESERVATIVE FREE 10 ML: 5 INJECTION INTRAVENOUS at 20:24

## 2021-12-29 RX ADMIN — MEROPENEM AND SODIUM CHLORIDE 1000 MG: 1 INJECTION, SOLUTION INTRAVENOUS at 12:36

## 2021-12-29 RX ADMIN — ONDANSETRON 4 MG: 2 INJECTION INTRAMUSCULAR; INTRAVENOUS at 20:24

## 2021-12-29 RX ADMIN — ONDANSETRON 4 MG: 2 INJECTION INTRAMUSCULAR; INTRAVENOUS at 09:24

## 2021-12-29 RX ADMIN — HYDROMORPHONE HYDROCHLORIDE 1 MG: 1 INJECTION, SOLUTION INTRAMUSCULAR; INTRAVENOUS; SUBCUTANEOUS at 22:35

## 2021-12-29 RX ADMIN — PANTOPRAZOLE SODIUM 40 MG: 40 INJECTION, POWDER, FOR SOLUTION INTRAVENOUS at 09:21

## 2021-12-29 RX ADMIN — HYDROMORPHONE HYDROCHLORIDE 1 MG: 1 INJECTION, SOLUTION INTRAMUSCULAR; INTRAVENOUS; SUBCUTANEOUS at 12:23

## 2021-12-29 RX ADMIN — HYDROMORPHONE HYDROCHLORIDE 1 MG: 1 INJECTION, SOLUTION INTRAMUSCULAR; INTRAVENOUS; SUBCUTANEOUS at 18:27

## 2021-12-29 ASSESSMENT — PAIN SCALES - GENERAL
PAINLEVEL_OUTOF10: 8
PAINLEVEL_OUTOF10: 7
PAINLEVEL_OUTOF10: 2
PAINLEVEL_OUTOF10: 0
PAINLEVEL_OUTOF10: 7
PAINLEVEL_OUTOF10: 2
PAINLEVEL_OUTOF10: 7
PAINLEVEL_OUTOF10: 7

## 2021-12-29 ASSESSMENT — ENCOUNTER SYMPTOMS
SHORTNESS OF BREATH: 0
SINUS PAIN: 0
COUGH: 0
CONSTIPATION: 0
COLOR CHANGE: 1
WHEEZING: 0
DIARRHEA: 0
ABDOMINAL DISTENTION: 0
VOMITING: 0
NAUSEA: 1
TROUBLE SWALLOWING: 0
ABDOMINAL PAIN: 1
BLOOD IN STOOL: 0
SORE THROAT: 0

## 2021-12-29 ASSESSMENT — PAIN DESCRIPTION - LOCATION: LOCATION: ABDOMEN

## 2021-12-29 ASSESSMENT — PAIN DESCRIPTION - PAIN TYPE: TYPE: ACUTE PAIN

## 2021-12-29 NOTE — PROGRESS NOTES
[facility]      Patient:  Tamra Delaney  YOB: 1980  Date of Service: 12/29/2021  MRN: [id number]   Acct: [de-identified]   Primary Care Physician: Lennox Byars, MD  Advance Directive: Full Code  Admit Date: 12/27/2021       Hospital Day: 2  Portions of this note have been copied forward, however, changed to reflect the most current clinical status of this patient. CHIEF COMPLAINT Jaundice    SUBJECTIVE: C/O thirst, requesting diet to progress if possible. Report improve N/V with Zofran. RUQ pain pain intermittent and dull radiates to mid back area. Denies fever or chills. [address] COURSE    41 yo female admitted on 12/27/2021for 1 day of jaundice with noticeable scleral icteri. Associated abdominal cramping and nausea. Denies any previous hepatic hx. Denies heavy ETOH use, occasional mixed drink. States that PCP has told her in the past that she had mild elevation of LFT's due to \"fatty liver\". Cholecystectomy in June due to cholelithiasis-IOC was unremarkable per surgeon's note. To be noted that a liver biopsy was obtained at that time that showed mild steatosis. Continues to have elevated LFT's. Bilirubin increased to 7.4 on 12/29/21. GI consultant requested possible transfer to facility with ERCP for questionable obstruction, however, [facility] in [place] both are full. She has been put on the low acuity list at [facility] on 12/29/2021.    12/28/21-C/O mild nausea and poor appetite. Denies abdominal pain. C/O itching to palms and plantar surfaces. Remains afebrile. Review of Systems:   Review of Systems   Constitutional: Positive for fatigue. Negative for chills, diaphoresis and fever. HENT: Negative for congestion, ear pain, sinus pain, sore throat and trouble swallowing. Eyes: Negative for visual disturbance. Respiratory: Negative for cough, shortness of breath and wheezing. Cardiovascular: Negative for chest pain, palpitations and leg swelling. Gastrointestinal: Positive for abdominal pain and nausea. Negative for abdominal distention, blood in stool, constipation, diarrhea and vomiting. Endocrine: Negative for cold intolerance and heat intolerance. Genitourinary: Negative for difficulty urinating, flank pain, frequency and urgency. Musculoskeletal: Negative for arthralgias and myalgias. Skin: Positive for color change. Neurological: Negative for dizziness, syncope, weakness, light-headedness, numbness and headaches. Hematological: Does not bruise/bleed easily. Psychiatric/Behavioral: Negative for agitation, confusion and dysphoric mood. 14 point review of systems is negative except as specifically addressed above. Objective:   VITALS:  /68   Pulse 71   Temp 97.5 °F (36.4 °C) (Temporal)   Resp 16   Ht 5' 5\" (1.651 m)   Wt 282 lb (127.9 kg)   SpO2 96%   BMI 46.93 kg/m²   24HR INTAKE/OUTPUT:      Intake/Output Summary (Last 24 hours) at 12/29/2021 1615  Last data filed at 12/29/2021 1200  Gross per 24 hour   Intake 450.13 ml   Output    Net 450.13 ml       Physical Exam  Constitutional:       General: She is not in acute distress. Appearance: Normal appearance. She is obese. She is not toxic-appearing or diaphoretic. HENT:      Head: Normocephalic and atraumatic. Right Ear: External ear normal.      Left Ear: External ear normal.      Nose: Nose normal. No congestion or rhinorrhea. Mouth/Throat:      Mouth: Mucous membranes are moist.      Pharynx: Oropharynx is clear. Eyes:      General: Scleral icterus present. Extraocular Movements: Extraocular movements intact. Conjunctiva/sclera: Conjunctivae normal.   Cardiovascular:      Rate and Rhythm: Normal rate and regular rhythm. Pulses: Normal pulses. Heart sounds: Normal heart sounds. No murmur heard. No friction rub. No gallop. Pulmonary:      Effort: Pulmonary effort is normal. No respiratory distress.       Breath sounds: Normal breath sounds. No wheezing, rhonchi or rales. Abdominal:      General: Abdomen is flat. Bowel sounds are normal. There is distension (Softly). Palpations: Abdomen is soft. Tenderness: There is no abdominal tenderness. Musculoskeletal:         General: No swelling. Normal range of motion. Cervical back: Normal range of motion and neck supple. Right lower leg: No edema. Left lower leg: No edema. Skin:     General: Skin is warm and dry. Coloration: Skin is jaundiced. Findings: No erythema, lesion or rash. Neurological:      General: No focal deficit present. Mental Status: She is alert and oriented to person, place, and time. Mental status is at baseline. Cranial Nerves: No cranial nerve deficit. Sensory: No sensory deficit. Motor: No weakness. Psychiatric:         Mood and Affect: Mood normal.         Behavior: Behavior normal.         Thought Content: Thought content normal.         Judgment: Judgment normal.             Medications:      sodium chloride 75 mL/hr at 12/29/21 1235    sodium chloride        meropenem  1,000 mg IntraVENous Q12H    pantoprazole  40 mg IntraVENous BID    And    sodium chloride (PF)  10 mL IntraVENous Daily    sodium chloride flush  5-40 mL IntraVENous 2 times per day    [Held by provider] enoxaparin  40 mg SubCUTAneous Daily     hydrocortisone, HYDROmorphone, diphenhydrAMINE, sodium chloride flush, sodium chloride, ondansetron **OR** ondansetron, polyethylene glycol  ADULT DIET;  Clear Liquid     Lab and other Data:     Recent Labs     12/27/21  1548 12/29/21  0554   WBC 8.2 6.4   HGB 15.7 13.0    120*     Recent Labs     12/27/21  1548 12/28/21  0256 12/29/21  0554    138 139   K 4.2 4.4 4.6    103 105   CO2 24 22 22   BUN 9 8 11   CREATININE 0.5 0.6 0.5   GLUCOSE 109 93 85     Recent Labs     12/27/21  1548 12/28/21  0256 12/29/21  0554   * 141* 164*   * 189* 188*   BILITOT 5. 9* 6.8* 8.3*   ALKPHOS 226* 204* 229*     Troponin T: No results for input(s): TROPONINI in the last 72 hours. Pro-BNP: No results for input(s): BNP in the last 72 hours. INR:   Recent Labs     12/27/21 1948   INR 0.86*       RAD:   CT ABDOMEN PELVIS W IV CONTRAST Additional Contrast? None    Result Date: 12/27/2021  1. Mild hepatic steatosis without focal liver lesions. The gallbladder surgically absent. No biliary ductal dilatation. 2. Mild splenomegaly. 3. Endometrium appears mildly thickened and irregular, correlate with patient presentation. 4. 2.8 cm fat-containing left adnexal mass, ovarian dermoid considered. GYN consultation recommended. 5. Otherwise, No CT evidence of acute intra-abdominal/pelvic pathological process. Signed by Dr Agustina King LIVER    Result Date: 12/28/2021  Unremarkable hepatic ultrasound. No biliary ductal dilatation. Evidence of previous cholecystectomy. Signed by Dr Claude Banana. Thanh    MRI ABDOMEN WO CONTRAST MRCP    Result Date: 12/28/2021  No acute abnormality, no intrahepatic or extrahepatic biliary ductal dilatation and no obvious choledocholithiasis. There is previous cholecystectomy. Normal appearance of the liver. A preliminary report was provided by the [facility] radiology service. There is no significant discrepancy with the preliminary report. Signed by Dr Claude Banana. Thanh           Assessment/Plan   Active Problems:    Jaundice   -Trend labs-specifically Alk phos, ALT, AST and bili (stable)   -Autoimmune hepatitis labs pending   -??? Transfer vs. Dr. Paolo Castellon for ERCP when he returns-will continue to monitor    -AM labs   -Prn Benadryl cream for itching       Adnexal mass   -OB/GYN-seen to follow-up outpatient    Resolved Problems:    * No resolved hospital problems.  *       Antibiotic: Meropenem    DVT Prophylaxis:SCD's    GI prophylaxis: Protonix    Disposition: TBD      Call transfer center and discussed case with multiple facilities including [address] [place], and [facility]. [facility] is [facility]. [address] not capable of ERCP. [facility] is currently at capacity. Discussed case with Dr. Carly Gupta for GI services. She has been placed on the low acuity transfer list. He did advise that this list has patients that have been on this list for at least 2 weeks. Suggested we continue to monitor and manage at our facility and await our endoscopist return for ERCP and further management.  Dr. Fred Boxer updated of plan of care    Avni Cardona, APRN, 12/29/2021 4:15 PM

## 2021-12-29 NOTE — PROGRESS NOTES
GI  - PROGRESS NOTE    Subjective:   Admit Date: 12/27/2021  PCP: Larisa Llanes MD    Patient being seen for: Abnormal LFTs     Patient was seen in consultation on 12/28/2021 for abdominal pain and jaundice. He had cholecystectomy in June 2021. She also liver biopsy and intraoperative cholangiogram in June 2021. Liver biopsy showed mild hepatic steatosis. There were no significant fibrosis or cirrhosis. Patient presented with abnormal LFTs. Imaging with MRCP, ultrasound and CT scan did not show any CBD stones. 24hr events/Interval History: Chaperone: Michael Rush RN     Liver tests are getting worse. Patient complains of abdominal discomfort. Denies fever or chills. ,  Medications from helpful stools. Denies taking complementary, supplementary, herbal medications, natural medications  12/29/2020: Alkaline phosphatase 229 and going up,  and stable,  and going up total bili 8.3 and going up.    12/27/2021: Alkaline phosphatase 226, , , total bili 5.9. Medications:    Scheduled Meds:   meropenem  1,000 mg IntraVENous Q12H    pantoprazole  40 mg IntraVENous BID    And    sodium chloride (PF)  10 mL IntraVENous Daily    sodium chloride flush  5-40 mL IntraVENous 2 times per day    [Held by provider] enoxaparin  40 mg SubCUTAneous Daily       Continuous Infusions:   sodium chloride 75 mL/hr at 12/28/21 2335    sodium chloride         PRN Meds:. HYDROmorphone, diphenhydrAMINE, diphenhydrAMINE-zinc acetate, sodium chloride flush, sodium chloride, ondansetron **OR** ondansetron, polyethylene glycol      Labs:     Recent Labs     12/27/21  1548 12/29/21  0554   WBC 8.2 6.4   RBC 5.56* 4.61   HGB 15.7 13.0   HCT 49.7* 41.0   MCV 89.4 88.9   MCH 28.2 28.2   MCHC 31.6* 31.7*    120*       Recent Labs     12/27/21  1548 12/28/21  0256 12/29/21  0554    138 139   K 4.2 4.4 4.6   ANIONGAP 13 13 12    103 105   CO2 24 22 22   BUN 9 8 11   CREATININE 0.5 0.6 0.5   GLUCOSE 109 93 85   CALCIUM 10.0 9.3 9.0       No results for input(s): MG, PHOS in the last 72 hours. Recent Labs     12/27/21  1548 12/28/21  0256 12/29/21  0554   * 141* 164*   * 189* 188*   BILITOT 5.9* 6.8* 8.3*   ALKPHOS 226* 204* 229*       HgBA1c:  No components found for: HGBA1C    FLP:    Lab Results   Component Value Date    TRIG 122 12/28/2021    HDL 35 12/28/2021    LDLCALC 246 12/28/2021       TSH:  No results found for: TSH    Troponin T: No results for input(s): TROPONINI in the last 72 hours. INR:   Recent Labs     12/27/21  1948   INR 0.86*       Recent Labs     12/27/21  1548   LIPASE 21     -----------------------------------------------------------------  RAD: Reviewed results of ultrasound of the abdomen, MRCP and CT scan of the abdomen. Physical Exam:     Vitals:    12/28/21 1601 12/28/21 2016 12/29/21 0322 12/29/21 0718   BP: 128/73 119/63 127/71 130/68   Pulse: 67 62 70 71   Resp: 18 16 16 16   Temp: 98.6 °F (37 °C) 98.2 °F (36.8 °C) 96.4 °F (35.8 °C) 97.5 °F (36.4 °C)   TempSrc: Temporal Temporal Temporal Temporal   SpO2: 96% 95% 96% 96%   Weight:       Height:         Patient conscious, alert and oriented. Afebrile  In no acute physical distress. Moderately built and nourished. HEENT normocephalic. Mucous membranes are moist.  Neck supple no JVD. Abdomen soft, nontender without any gross organomegaly. No pedal edema. Skin no rash. 24HR INTAKE/OUTPUT:      Intake/Output Summary (Last 24 hours) at 12/29/2021 1209  Last data filed at 12/29/2021 0800  Gross per 24 hour   Intake 450.13 ml   Output    Net 450.13 ml       12/27/2021: CT abdomen and pelvis: Mild hepatic steatosis without focal liver lesions. Gallbladder surgically absent. No biliary ductal dilation. Mild splenomegaly. 2.8 cm fat-containing left adnexal mass, ovarian desmoid considered. GYN evaluation was recommended.     12/27/2021: MRCP: Nothing acute. No intrahepatic or extrahepatic biliary ductal dilation. No choledocholithiasis. Previous cholecystectomy. Normal appearance of the liver. 12/27/2021: Ultrasound of the liver: Unremarkable hepatic ultrasound. No biliary ductal dilation. Patient is cholecystectomy. Impression:       Abnormal LFTs. No extrahepatic biliary obstruction by MRCP, ultrasound and CAT scan of the abdomen. All these modalities can miss sludge or stones in the CBD. Even though imaging does not show any CBD stones, ultrasound and CT both can miss common bile duct stone in about 50% time. MRCP can miss CBD stones or sludge in 5 to 10% of time. Patient did come with biliary colic. Patient's previous liver biopsy does not show any underlying liver disease. Viral hepatitis has been ruled out. LFTs are going up. Acute hepatitis serology was negative on 12/27/2021 for hepatitis A, B, C.    2.8 cm fat-containing left adnexal mass. Suggest GYN evaluation as per hospitalist medicine. Plan:     GONZALO Hall is not available. Apparently he will be back next Monday. Patient's LFTs are getting worse. Suggest to transfer the patient to a facility where ERCP facility is available because patient may have small CBD stones or sludge. We have not found any other cause abnormal LFTs. My GI call ends at 7 AM.  After that I will not be able to follow-up with this patient. GONZALO Alamo will be on call starting tomorrow morning. I will sign out the patient to her. Suggest GYN consultation to evaluate 2.8 cm fat-containing left adnexal mass. Barbara Solomon MD    12/29/2021    12:09 PM     12/29/2021: 12:38 PM: Addendum:     Talked with Dr. Gale Ramirez. Suggested transfer the patient to a higher level facility which ERCP facility is available. Dr. Gale Ramirez will try to transfer the patient. He also told me that GYN was consulted and they agreed to follow this patient as an outpatient basis.     Reviewed records from [facility]. 6/29/2021: Gallbladder pathology chronic cholecystitis with cholelithiasis. Liver core biopsy mild steatosis. No significant portal inflammation. Trichrome is negative for periportal fibrosis/cirrhosis. Iron stain negative. Nuclear stain highlights normal hepatic plate architecture. Iron stain negative. 6/29/2021: Time 9:02 AM: operative note: Liver was somewhat enlarged, rounded borders consistent with fatty liver. Chronic inflammation noted in the nuvia hepatis with a large number of stones impacted in the antrum 20:.  Cholangiography obtained. Increased difficulty in completing the cholangiogram at the stones were disimpacted from the infundibulum. Ultimately a cholangiogram was completed showing a normal distal cholangiogram and also pancreatogram without obstruction. There was free flow into the left and right hepatic radicles, hepatic duct, bile duct and free flow into the duodenum.

## 2021-12-30 LAB
ALBUMIN SERPL-MCNC: 3.4 G/DL (ref 3.5–5.2)
ALP BLD-CCNC: 246 U/L (ref 35–104)
ALT SERPL-CCNC: 197 U/L (ref 5–33)
ANION GAP SERPL CALCULATED.3IONS-SCNC: 11 MMOL/L (ref 7–19)
AST SERPL-CCNC: 186 U/L (ref 5–32)
BASOPHILS ABSOLUTE: 0 K/UL (ref 0–0.2)
BASOPHILS RELATIVE PERCENT: 0.7 % (ref 0–1)
BILIRUB SERPL-MCNC: 8.7 MG/DL (ref 0.2–1.2)
BILIRUBIN DIRECT: 7.2 MG/DL (ref 0–0.3)
BILIRUBIN, INDIRECT: 1.5 MG/DL (ref 0.1–1)
BUN BLDV-MCNC: 9 MG/DL (ref 6–20)
CALCIUM SERPL-MCNC: 8.7 MG/DL (ref 8.6–10)
CHLORIDE BLD-SCNC: 104 MMOL/L (ref 98–111)
CO2: 22 MMOL/L (ref 22–29)
CREAT SERPL-MCNC: 0.4 MG/DL (ref 0.5–0.9)
EOSINOPHILS ABSOLUTE: 0.3 K/UL (ref 0–0.6)
EOSINOPHILS RELATIVE PERCENT: 5.2 % (ref 0–5)
GFR AFRICAN AMERICAN: >59
GFR NON-AFRICAN AMERICAN: >60
GLUCOSE BLD-MCNC: 88 MG/DL (ref 74–109)
HCT VFR BLD CALC: 39.9 % (ref 37–47)
HEMOGLOBIN: 12.8 G/DL (ref 12–16)
IMMATURE GRANULOCYTES #: 0 K/UL
LYMPHOCYTES ABSOLUTE: 2.3 K/UL (ref 1.1–4.5)
LYMPHOCYTES RELATIVE PERCENT: 37.9 % (ref 20–40)
MCH RBC QN AUTO: 28.3 PG (ref 27–31)
MCHC RBC AUTO-ENTMCNC: 32.1 G/DL (ref 33–37)
MCV RBC AUTO: 88.1 FL (ref 81–99)
MONOCYTES ABSOLUTE: 0.4 K/UL (ref 0–0.9)
MONOCYTES RELATIVE PERCENT: 6.9 % (ref 0–10)
NEUTROPHILS ABSOLUTE: 3 K/UL (ref 1.5–7.5)
NEUTROPHILS RELATIVE PERCENT: 49.1 % (ref 50–65)
PDW BLD-RTO: 15 % (ref 11.5–14.5)
PLATELET # BLD: 115 K/UL (ref 130–400)
PMV BLD AUTO: 13 FL (ref 9.4–12.3)
POTASSIUM SERPL-SCNC: 4 MMOL/L (ref 3.5–5)
RBC # BLD: 4.53 M/UL (ref 4.2–5.4)
SODIUM BLD-SCNC: 137 MMOL/L (ref 136–145)
TOTAL PROTEIN: 5.6 G/DL (ref 6.6–8.7)
WBC # BLD: 6.1 K/UL (ref 4.8–10.8)

## 2021-12-30 PROCEDURE — 82248 BILIRUBIN DIRECT: CPT

## 2021-12-30 PROCEDURE — 85025 COMPLETE CBC W/AUTO DIFF WBC: CPT

## 2021-12-30 PROCEDURE — 6360000002 HC RX W HCPCS: Performed by: HOSPITALIST

## 2021-12-30 PROCEDURE — 1210000000 HC MED SURG R&B

## 2021-12-30 PROCEDURE — 80053 COMPREHEN METABOLIC PANEL: CPT

## 2021-12-30 PROCEDURE — 2580000003 HC RX 258: Performed by: HOSPITALIST

## 2021-12-30 PROCEDURE — 6370000000 HC RX 637 (ALT 250 FOR IP): Performed by: HOSPITALIST

## 2021-12-30 PROCEDURE — 99232 SBSQ HOSP IP/OBS MODERATE 35: CPT | Performed by: INTERNAL MEDICINE

## 2021-12-30 PROCEDURE — C9113 INJ PANTOPRAZOLE SODIUM, VIA: HCPCS | Performed by: HOSPITALIST

## 2021-12-30 RX ADMIN — HYDROMORPHONE HYDROCHLORIDE 1 MG: 1 INJECTION, SOLUTION INTRAMUSCULAR; INTRAVENOUS; SUBCUTANEOUS at 15:36

## 2021-12-30 RX ADMIN — PANTOPRAZOLE SODIUM 40 MG: 40 INJECTION, POWDER, FOR SOLUTION INTRAVENOUS at 20:27

## 2021-12-30 RX ADMIN — HYDROMORPHONE HYDROCHLORIDE 1 MG: 1 INJECTION, SOLUTION INTRAMUSCULAR; INTRAVENOUS; SUBCUTANEOUS at 10:12

## 2021-12-30 RX ADMIN — PANTOPRAZOLE SODIUM 40 MG: 40 INJECTION, POWDER, FOR SOLUTION INTRAVENOUS at 08:50

## 2021-12-30 RX ADMIN — MEROPENEM AND SODIUM CHLORIDE 1000 MG: 1 INJECTION, SOLUTION INTRAVENOUS at 00:18

## 2021-12-30 RX ADMIN — ONDANSETRON 4 MG: 2 INJECTION INTRAMUSCULAR; INTRAVENOUS at 20:28

## 2021-12-30 RX ADMIN — ONDANSETRON 4 MG: 2 INJECTION INTRAMUSCULAR; INTRAVENOUS at 08:50

## 2021-12-30 RX ADMIN — DIPHENHYDRAMINE HYDROCHLORIDE 25 MG: 25 TABLET ORAL at 00:20

## 2021-12-30 RX ADMIN — HYDROMORPHONE HYDROCHLORIDE 1 MG: 1 INJECTION, SOLUTION INTRAMUSCULAR; INTRAVENOUS; SUBCUTANEOUS at 20:32

## 2021-12-30 RX ADMIN — HYDROMORPHONE HYDROCHLORIDE 1 MG: 1 INJECTION, SOLUTION INTRAMUSCULAR; INTRAVENOUS; SUBCUTANEOUS at 02:44

## 2021-12-30 RX ADMIN — MEROPENEM AND SODIUM CHLORIDE 1000 MG: 1 INJECTION, SOLUTION INTRAVENOUS at 15:36

## 2021-12-30 RX ADMIN — SODIUM CHLORIDE, PRESERVATIVE FREE 10 ML: 5 INJECTION INTRAVENOUS at 08:53

## 2021-12-30 ASSESSMENT — ENCOUNTER SYMPTOMS
VOMITING: 1
NAUSEA: 1
ABDOMINAL PAIN: 1

## 2021-12-30 ASSESSMENT — PAIN SCALES - GENERAL
PAINLEVEL_OUTOF10: 7
PAINLEVEL_OUTOF10: 6
PAINLEVEL_OUTOF10: 7
PAINLEVEL_OUTOF10: 8

## 2021-12-30 NOTE — PLAN OF CARE
Problem: Activity:  Goal: Risk for activity intolerance will decrease  Description: Risk for activity intolerance will decrease  Outcome: Ongoing     Problem:  Bowel/Gastric:  Goal: Bowel function will improve  Description: Bowel function will improve  Outcome: Ongoing  Goal: Diagnostic test results will improve  Description: Diagnostic test results will improve  Outcome: Ongoing  Goal: Occurrences of nausea will decrease  Description: Occurrences of nausea will decrease  Outcome: Ongoing  Goal: Occurrences of vomiting will decrease  Description: Occurrences of vomiting will decrease  Outcome: Ongoing     Problem: Fluid Volume:  Goal: Maintenance of adequate hydration will improve  Description: Maintenance of adequate hydration will improve  Outcome: Ongoing     Problem: Health Behavior:  Goal: Ability to state signs and symptoms to report to health care provider will improve  Description: Ability to state signs and symptoms to report to health care provider will improve  Outcome: Ongoing     Problem: Physical Regulation:  Goal: Complications related to the disease process, condition or treatment will be avoided or minimized  Description: Complications related to the disease process, condition or treatment will be avoided or minimized  Outcome: Ongoing  Goal: Ability to maintain clinical measurements within normal limits will improve  Description: Ability to maintain clinical measurements within normal limits will improve  Outcome: Ongoing     Problem: Sensory:  Goal: Ability to identify factors that increase the pain will improve  Description: Ability to identify factors that increase the pain will improve  Outcome: Ongoing  Goal: Ability to notify healthcare provider of pain before it becomes unmanageable or unbearable will improve  Description: Ability to notify healthcare provider of pain before it becomes unmanageable or unbearable will improve  Outcome: Ongoing  Goal: Pain level will decrease  Description: Pain level will decrease  Outcome: Ongoing     Problem: Nutrition  Goal: Optimal nutrition therapy  Outcome: Ongoing     Problem: Pain:  Description: Pain management should include both nonpharmacologic and pharmacologic interventions.   Goal: Pain level will decrease  Description: Pain level will decrease  Outcome: Ongoing  Goal: Control of acute pain  Description: Control of acute pain  Outcome: Ongoing  Goal: Control of chronic pain  Description: Control of chronic pain  Outcome: Ongoing

## 2021-12-30 NOTE — PROGRESS NOTES
[id number] [facility]      Patient:  Frankie Victoria  YOB: 1980  Date of Service: 12/30/2021  MRN: [id number]   Acct: [de-identified]   Primary Care Physician: Jessika Fried MD  Advance Directive: Full Code  Admit Date: 12/27/2021       Hospital Day: 3  Portions of this note have been copied forward, however, changed to reflect the most current clinical status of this patient. CHIEF COMPLAINT Jaundice    SUBJECTIVE:  Episode of nausea, nonbilious vomiting and upper abdominal pain today    Rene 10: 43 yo female presented to [facility] ED on 12/27/2021 for evaluation of yellow skin/jaundice that she had reported beginning day of admission. She had reported  One week history of nonbloody/nonbilious emesis associated with epigastric abdominal pain and lethargy. She had denied any previous history of liver problems. She denied history of heavy ETOH use reporting that she drank occasional mixed drink. She reported history of \"fatty liver\" and had cholecystectomy in June due to cholelithiasis. Gastroenterology was consulted and evaluated patient 12/28. GI consultant requested possible transfer to facility with ERCP for questionable obstruction, however, [facility] [place] both are full. She has been put on the low acuity list at [facility] on 12/29/2021. MRCP was negative, US of liver performed. Gynecology consulted however will follow patient as outpatient. 12/30 NP hepatobiliary test with concern for partial distal common bile duct obstruction. Transfer center was called in attempt to transfer patient to [facility] for ERCP by Dr. Hayde Glass. Transfer center related that she was accepted at [facility] when a bed is available however they would contact [facility] for possible transfer there. Transfer center contacted [facility] however no GI coverage today. Review of Systems:   Review of Systems   Constitutional: Negative for fever.    Gastrointestinal: Positive for abdominal pain, nausea and vomiting. All other systems reviewed and are negative. 14 point review of systems is negative except as specifically addressed above. Objective:   VITALS:  /68   Pulse 64   Temp 97 °F (36.1 °C) (Temporal)   Resp 18   Ht 5' 5\" (1.651 m)   Wt 282 lb (127.9 kg)   SpO2 93%   BMI 46.93 kg/m²   24HR INTAKE/OUTPUT:    Intake/Output Summary (Last 24 hours) at 12/30/2021 0830  Last data filed at 12/30/2021 0433  Gross per 24 hour   Intake 300 ml   Output    Net 300 ml       Physical Exam  Vitals reviewed. Constitutional:       Comments: 42 yr old female, awake, alert and in no apparent distress   HENT:      Head: Normocephalic. Right Ear: External ear normal.      Left Ear: External ear normal.   Eyes:      General: Scleral icterus present. Conjunctiva/sclera: Conjunctivae normal.      Pupils: Pupils are equal, round, and reactive to light. Cardiovascular:      Rate and Rhythm: Normal rate and regular rhythm. Heart sounds: Normal heart sounds. Pulmonary:      Effort: Pulmonary effort is normal.      Breath sounds: Normal breath sounds. Abdominal:      General: Bowel sounds are normal.      Palpations: Abdomen is soft. Tenderness: There is no abdominal tenderness. Musculoskeletal:         General: Normal range of motion. Cervical back: Normal range of motion. Skin:     General: Skin is warm and dry. Coloration: Skin is jaundiced. Neurological:      Mental Status: She is alert and oriented to person, place, and time.              Medications:      sodium chloride 75 mL/hr at 12/29/21 1235    sodium chloride        meropenem  1,000 mg IntraVENous Q12H    pantoprazole  40 mg IntraVENous BID    And    sodium chloride (PF)  10 mL IntraVENous Daily    sodium chloride flush  5-40 mL IntraVENous 2 times per day    [Held by provider] enoxaparin  40 mg SubCUTAneous Daily     hydrocortisone, HYDROmorphone, diphenhydrAMINE, sodium chloride flush, sodium chloride, ondansetron **OR** ondansetron, polyethylene glycol  ADULT DIET; Clear Liquid     Lab and other Data:     Recent Labs     12/27/21  1548 12/29/21  0554 12/30/21  0439   WBC 8.2 6.4 6.1   HGB 15.7 13.0 12.8    120* 115*     Recent Labs     12/28/21  0256 12/29/21  0554 12/30/21  0439    139 137   K 4.4 4.6 4.0    105 104   CO2 22 22 22   BUN 8 11 9   CREATININE 0.6 0.5 0.4*   GLUCOSE 93 85 88     Recent Labs     12/28/21  0256 12/29/21  0554 12/30/21  0439   * 164* 186*   * 188* 197*   BILITOT 6.8* 8.3* 8.7*   ALKPHOS 204* 229* 246*     Troponin T: No results for input(s): TROPONINI in the last 72 hours. Pro-BNP: No results for input(s): BNP in the last 72 hours. INR:   Recent Labs     12/27/21 1948   INR 0.86*       RAD:   NM HEPATOBILIARY    Result Date: 12/28/2021  1. Very minimal radiopharmaceutical in the proximal small bowel and nonvisualization of the biliary system may represent hepatobiliary dysfunction or at least partial, significant, distal common bile duct obstruction? . Signed by Dr Svetlana Valdes Additional Contrast? None    Result Date: 12/27/2021  1. Mild hepatic steatosis without focal liver lesions. The gallbladder surgically absent. No biliary ductal dilatation. 2. Mild splenomegaly. 3. Endometrium appears mildly thickened and irregular, correlate with patient presentation. 4. 2.8 cm fat-containing left adnexal mass, ovarian dermoid considered. GYN consultation recommended. 5. Otherwise, No CT evidence of acute intra-abdominal/pelvic pathological process. Signed by Dr Nory Raymond LIVER    Result Date: 12/28/2021  Unremarkable hepatic ultrasound. No biliary ductal dilatation. Evidence of previous cholecystectomy. Signed by Dr Ernesto Bo.  [place]    MRI ABDOMEN WO CONTRAST MRCP    Result Date: 12/28/2021  No acute abnormality, no intrahepatic or extrahepatic biliary ductal dilatation and no obvious choledocholithiasis. There is previous cholecystectomy. Normal appearance of the liver. A preliminary report was provided by the [facility] radiology service. There is no significant discrepancy with the preliminary report. Signed by Dr Rashmi Villalta. Thanh       Assessment/Plan   Active Problems:    Jaundice    Adnexal mass  Resolved Problems:    * No resolved hospital problems. *    Active Problems:    Jaundice              -Trend labs-specifically Alk phos, ALT, AST and                        -T.bili/direct/indirect    -Suspect obstructive jaundice as direct bili is 7.2               -Autoimmune hepatitis labs pending   -Acute hepatitis panel negative              -Await transfer to [facility]   -Dr. Cinthia Samaniego available for ERCP next week              -follow labs labs                         Adnexal mass              -OB/GYN to follow            Resolved Problems:    * No resolved hospital problems.  *        Antibiotic: Meropenem     DVT Prophylaxis:SCD's     GI prophylaxis: Protonix     Disposition: LUIZ Escamilla, DAVID - CNP, 12/30/2021 8:30 AM

## 2021-12-31 LAB
ALBUMIN SERPL-MCNC: 3.4 G/DL (ref 3.5–5.2)
ALP BLD-CCNC: 260 U/L (ref 35–104)
ALT SERPL-CCNC: 227 U/L (ref 5–33)
ANION GAP SERPL CALCULATED.3IONS-SCNC: 11 MMOL/L (ref 7–19)
ANTINUCLEAR AB INTERPRETIVE COMMENT: NORMAL
ANTINUCLEAR ANTIBODY, HEP-2, IGG: NORMAL
AST SERPL-CCNC: 213 U/L (ref 5–32)
BASOPHILS ABSOLUTE: 0 K/UL (ref 0–0.2)
BASOPHILS RELATIVE PERCENT: 0.4 % (ref 0–1)
BILIRUB SERPL-MCNC: 7.2 MG/DL (ref 0.2–1.2)
BILIRUBIN DIRECT: 5.7 MG/DL (ref 0–0.3)
BILIRUBIN, INDIRECT: 1.5 MG/DL (ref 0.1–1)
BUN BLDV-MCNC: 5 MG/DL (ref 6–20)
CALCIUM SERPL-MCNC: 8.7 MG/DL (ref 8.6–10)
CHLORIDE BLD-SCNC: 105 MMOL/L (ref 98–111)
CO2: 25 MMOL/L (ref 22–29)
CREAT SERPL-MCNC: 0.5 MG/DL (ref 0.5–0.9)
EOSINOPHILS ABSOLUTE: 0.3 K/UL (ref 0–0.6)
EOSINOPHILS RELATIVE PERCENT: 4.6 % (ref 0–5)
F-ACTIN AB IGG: 7 UNITS (ref 0–19)
GFR AFRICAN AMERICAN: >59
GFR NON-AFRICAN AMERICAN: >60
GLUCOSE BLD-MCNC: 88 MG/DL (ref 74–109)
HCT VFR BLD CALC: 38.5 % (ref 37–47)
HEMOGLOBIN: 12.5 G/DL (ref 12–16)
IMMATURE GRANULOCYTES #: 0 K/UL
LIVER-KIDNEY MICROSOME-1 AB IGG: 0.6 U (ref 0–24.9)
LYMPHOCYTES ABSOLUTE: 2.8 K/UL (ref 1.1–4.5)
LYMPHOCYTES RELATIVE PERCENT: 39.3 % (ref 20–40)
MCH RBC QN AUTO: 28.7 PG (ref 27–31)
MCHC RBC AUTO-ENTMCNC: 32.5 G/DL (ref 33–37)
MCV RBC AUTO: 88.3 FL (ref 81–99)
MITOCHONDRIAL M2 AB, IGG: 15.7 UNITS (ref 0–24.9)
MONOCYTES ABSOLUTE: 0.6 K/UL (ref 0–0.9)
MONOCYTES RELATIVE PERCENT: 7.6 % (ref 0–10)
NEUTROPHILS ABSOLUTE: 3.5 K/UL (ref 1.5–7.5)
NEUTROPHILS RELATIVE PERCENT: 48 % (ref 50–65)
PDW BLD-RTO: 15.3 % (ref 11.5–14.5)
PLATELET # BLD: 119 K/UL (ref 130–400)
PMV BLD AUTO: 13.9 FL (ref 9.4–12.3)
POTASSIUM SERPL-SCNC: 4.1 MMOL/L (ref 3.5–5)
RBC # BLD: 4.36 M/UL (ref 4.2–5.4)
SODIUM BLD-SCNC: 141 MMOL/L (ref 136–145)
TOTAL PROTEIN: 5.9 G/DL (ref 6.6–8.7)
WBC # BLD: 7.2 K/UL (ref 4.8–10.8)

## 2021-12-31 PROCEDURE — 1210000000 HC MED SURG R&B

## 2021-12-31 PROCEDURE — 82248 BILIRUBIN DIRECT: CPT

## 2021-12-31 PROCEDURE — C9113 INJ PANTOPRAZOLE SODIUM, VIA: HCPCS | Performed by: HOSPITALIST

## 2021-12-31 PROCEDURE — 6360000002 HC RX W HCPCS: Performed by: HOSPITALIST

## 2021-12-31 PROCEDURE — 80053 COMPREHEN METABOLIC PANEL: CPT

## 2021-12-31 PROCEDURE — 2580000003 HC RX 258: Performed by: HOSPITALIST

## 2021-12-31 PROCEDURE — 36415 COLL VENOUS BLD VENIPUNCTURE: CPT

## 2021-12-31 PROCEDURE — 85025 COMPLETE CBC W/AUTO DIFF WBC: CPT

## 2021-12-31 RX ADMIN — SODIUM CHLORIDE, PRESERVATIVE FREE 10 ML: 5 INJECTION INTRAVENOUS at 20:09

## 2021-12-31 RX ADMIN — SODIUM CHLORIDE: 9 INJECTION, SOLUTION INTRAVENOUS at 17:50

## 2021-12-31 RX ADMIN — PANTOPRAZOLE SODIUM 40 MG: 40 INJECTION, POWDER, FOR SOLUTION INTRAVENOUS at 08:19

## 2021-12-31 RX ADMIN — HYDROMORPHONE HYDROCHLORIDE 1 MG: 1 INJECTION, SOLUTION INTRAMUSCULAR; INTRAVENOUS; SUBCUTANEOUS at 20:09

## 2021-12-31 RX ADMIN — MEROPENEM AND SODIUM CHLORIDE 1000 MG: 1 INJECTION, SOLUTION INTRAVENOUS at 11:10

## 2021-12-31 RX ADMIN — SODIUM CHLORIDE, PRESERVATIVE FREE 10 ML: 5 INJECTION INTRAVENOUS at 08:24

## 2021-12-31 RX ADMIN — HYDROMORPHONE HYDROCHLORIDE 1 MG: 1 INJECTION, SOLUTION INTRAMUSCULAR; INTRAVENOUS; SUBCUTANEOUS at 16:37

## 2021-12-31 RX ADMIN — MEROPENEM AND SODIUM CHLORIDE 1000 MG: 1 INJECTION, SOLUTION INTRAVENOUS at 00:10

## 2021-12-31 RX ADMIN — HYDROMORPHONE HYDROCHLORIDE 1 MG: 1 INJECTION, SOLUTION INTRAMUSCULAR; INTRAVENOUS; SUBCUTANEOUS at 08:19

## 2021-12-31 RX ADMIN — PANTOPRAZOLE SODIUM 40 MG: 40 INJECTION, POWDER, FOR SOLUTION INTRAVENOUS at 20:09

## 2021-12-31 RX ADMIN — HYDROMORPHONE HYDROCHLORIDE 1 MG: 1 INJECTION, SOLUTION INTRAMUSCULAR; INTRAVENOUS; SUBCUTANEOUS at 00:32

## 2021-12-31 RX ADMIN — SODIUM CHLORIDE, PRESERVATIVE FREE 10 ML: 5 INJECTION INTRAVENOUS at 08:25

## 2021-12-31 RX ADMIN — ONDANSETRON 4 MG: 2 INJECTION INTRAMUSCULAR; INTRAVENOUS at 08:19

## 2021-12-31 RX ADMIN — HYDROMORPHONE HYDROCHLORIDE 1 MG: 1 INJECTION, SOLUTION INTRAMUSCULAR; INTRAVENOUS; SUBCUTANEOUS at 12:14

## 2021-12-31 RX ADMIN — ONDANSETRON 4 MG: 2 INJECTION INTRAMUSCULAR; INTRAVENOUS at 16:37

## 2021-12-31 ASSESSMENT — PAIN SCALES - GENERAL
PAINLEVEL_OUTOF10: 4
PAINLEVEL_OUTOF10: 3
PAINLEVEL_OUTOF10: 7
PAINLEVEL_OUTOF10: 0
PAINLEVEL_OUTOF10: 9
PAINLEVEL_OUTOF10: 7
PAINLEVEL_OUTOF10: 8
PAINLEVEL_OUTOF10: 3
PAINLEVEL_OUTOF10: 8

## 2021-12-31 NOTE — PROGRESS NOTES
[facility] Hospitalists      Patient:  Skyla Barrientos  YOB: 1980  Date of Service: 12/31/2021  MRN: [id number]   Acct: [de-identified]   Primary Care Physician: Krys Jones MD  Advance Directive: Full Code  Admit Date: 12/27/2021       Hospital Day: 4  Portions of this note have been copied forward, however, changed to reflect the most current clinical status of this patient. CHIEF COMPLAINT Jaundice    SUBJECTIVE:  Pt relates has been drinking fluids, still with nausea and abdominal pain when she eats    Kad[place] 10: 41 yo female presented to [facility] ED on 12/27/2021 for evaluation of yellow skin/jaundice that she had reported beginning day of admission. She had reported  One week history of nonbloody/nonbilious emesis associated with epigastric abdominal pain and lethargy. She had denied any previous history of liver problems. She denied history of heavy ETOH use reporting that she drank occasional mixed drink. She reported history of \"fatty liver\" and had cholecystectomy in June due to cholelithiasis. Gastroenterology was consulted and evaluated patient 12/28. GI consultant requested possible transfer to facility with ERCP for questionable obstruction, however[facility] [place] [facility]. She has been put on the low acuity list at [facility] on 12/29/2021. MRCP was negative, US of liver performed. Gynecology consulted however will follow patient as outpatient. 12/30 NP hepatobiliary test with concern for partial distal common bile duct obstruction. Transfer center was called in attempt to transfer patient to [facility] for ERCP by Dr. Mariano Meza. Transfer center related that she was accepted at [facility] when a bed is available however they would contact [facility] for possible transfer there. On 12/30, transfer center contacted [facility] however no GI coverage today. On 12/31 transfer center called however no GI coverage today.      Review of Systems: Review of Systems   Constitutional: Negative for fever. Gastrointestinal: Positive for abdominal pain, nausea and vomiting. All other systems reviewed and are negative. 14 point review of systems is negative except as specifically addressed above. Objective:   VITALS:  BP (!) 140/80   Pulse 64   Temp 96.8 °F (36 °C) (Temporal)   Resp 14   Ht 5' 5\" (1.651 m)   Wt 282 lb (127.9 kg)   SpO2 94%   BMI 46.93 kg/m²   24HR INTAKE/OUTPUT:      Intake/Output Summary (Last 24 hours) at 12/31/2021 0835  Last data filed at 12/30/2021 2014  Gross per 24 hour   Intake 3200 ml   Output    Net 3200 ml       Physical Exam  Vitals reviewed. Constitutional:       Comments: 42 yr old female, awake, alert and in no apparent distress   HENT:      Head: Normocephalic. Right Ear: External ear normal.      Left Ear: External ear normal.   Eyes:      General: Scleral icterus present. Conjunctiva/sclera: Conjunctivae normal.      Pupils: Pupils are equal, round, and reactive to light. Cardiovascular:      Rate and Rhythm: Normal rate and regular rhythm. Heart sounds: Normal heart sounds. Pulmonary:      Effort: Pulmonary effort is normal.      Breath sounds: Normal breath sounds. Abdominal:      General: Bowel sounds are normal.      Palpations: Abdomen is soft. Tenderness: There is no abdominal tenderness. Musculoskeletal:         General: Normal range of motion. Cervical back: Normal range of motion. Skin:     General: Skin is warm and dry. Coloration: Skin is jaundiced. Neurological:      Mental Status: She is alert and oriented to person, place, and time.              Medications:      sodium chloride 75 mL/hr at 12/29/21 1235    sodium chloride        meropenem  1,000 mg IntraVENous Q12H    pantoprazole  40 mg IntraVENous BID    And    sodium chloride (PF)  10 mL IntraVENous Daily    sodium chloride flush  5-40 mL IntraVENous 2 times per day    [Held by provider] enoxaparin  40 mg SubCUTAneous Daily     hydrocortisone, HYDROmorphone, diphenhydrAMINE, sodium chloride flush, sodium chloride, ondansetron **OR** ondansetron, polyethylene glycol  ADULT DIET; Clear Liquid     Lab and other Data:     Recent Labs     12/29/21  0554 12/30/21 0439 12/31/21  0309   WBC 6.4 6.1 7.2   HGB 13.0 12.8 12.5   * 115* 119*     Recent Labs     12/29/21  0554 12/30/21  0439 12/31/21  0309    137 141   K 4.6 4.0 4.1    104 105   CO2 22 22 25   BUN 11 9 5*   CREATININE 0.5 0.4* 0.5   GLUCOSE 85 88 88     Recent Labs     12/29/21  0554 12/30/21 0439 12/31/21 0309   * 186* 213*   * 197* 227*   BILITOT 8.3* 8.7* 7.2*   ALKPHOS 229* 246* 260*     Troponin T: No results for input(s): TROPONINI in the last 72 hours. Pro-BNP: No results for input(s): BNP in the last 72 hours. INR:   No results for input(s): INR in the last 72 hours. RAD:   NM HEPATOBILIARY    Result Date: 12/28/2021  1. Very minimal radiopharmaceutical in the proximal small bowel and nonvisualization of the biliary system may represent hepatobiliary dysfunction or at least partial, significant, distal common bile duct obstruction? . Signed by Dr Selina Denis Additional Contrast? None    Result Date: 12/27/2021  1. Mild hepatic steatosis without focal liver lesions. The gallbladder surgically absent. No biliary ductal dilatation. 2. Mild splenomegaly. 3. Endometrium appears mildly thickened and irregular, correlate with patient presentation. 4. 2.8 cm fat-containing left adnexal mass, ovarian dermoid considered. GYN consultation recommended. 5. Otherwise, No CT evidence of acute intra-abdominal/pelvic pathological process. Signed by Dr Kofi Rojas LIVER    Result Date: 12/28/2021  Unremarkable hepatic ultrasound. No biliary ductal dilatation. Evidence of previous cholecystectomy. Signed by Dr Fern Gilbert.  [place]    MRI ABDOMEN WO CONTRAST MRCP    Result Date: 12/28/2021  No acute abnormality, no intrahepatic or extrahepatic biliary ductal dilatation and no obvious choledocholithiasis. There is previous cholecystectomy. Normal appearance of the liver. A preliminary [facility]. There is no significant discrepancy with the preliminary report. Signed by Dr Yazan Martinez. [place]       Assessment/Plan   Active Problems:    Jaundice    Adnexal mass  Resolved Problems:    * No resolved hospital problems. *    Active Problems:    Jaundice              -Trend labs-specifically Alk phos, ALT, AST and                        -T.bili/direct/indirect/repeat INR     -Autoimmune hepatitis labs pending   -Acute hepatitis panel negative              -Await transfer to [facility]   -appreciate gastroenterology consultant/Dr. Aubrey Zuñiga     -Dr. Comfort Torres available for ERCP next week              -follow labs labs                         Adnexal mass              -OB/GYN to follow            Resolved Problems:    * No resolved hospital problems.  *        Antibiotic: Meropenem     DVT Prophylaxis:SCD's     GI prophylaxis: Protonix     Disposition: TBD        DAVID Hurley - CNP, 12/31/2021 8:35 AM

## 2021-12-31 NOTE — PLAN OF CARE
Nutrition Problem #1: Inadequate oral intake  Intervention: Food and/or Nutrient Delivery: Continue NPO,Start Oral Diet (as appropriate)  Nutritional Goals: Pt will tolerate diet advance to meet nutritional needs.     Problem: Nutrition  Goal: Optimal nutrition therapy  12/31/2021 1048 by Sammie Guzman, MS, RD, LD  Outcome: Ongoing  12/30/2021 2326 by Mari Pino RN  Outcome: Ongoing

## 2021-12-31 NOTE — PROGRESS NOTES
Transfer called this evening, and  stated that [facility] still has no bed, and we are still waiting and will keep us posted when they have one.

## 2022-01-01 LAB
ALBUMIN SERPL-MCNC: 3.1 G/DL (ref 3.5–5.2)
ALP BLD-CCNC: 252 U/L (ref 35–104)
ALT SERPL-CCNC: 215 U/L (ref 5–33)
ANION GAP SERPL CALCULATED.3IONS-SCNC: 9 MMOL/L (ref 7–19)
AST SERPL-CCNC: 168 U/L (ref 5–32)
BASOPHILS ABSOLUTE: 0 K/UL (ref 0–0.2)
BASOPHILS RELATIVE PERCENT: 0.6 % (ref 0–1)
BILIRUB SERPL-MCNC: 6 MG/DL (ref 0.2–1.2)
BILIRUBIN DIRECT: 4.3 MG/DL (ref 0–0.3)
BILIRUBIN, INDIRECT: 1.7 MG/DL (ref 0.1–1)
BLOOD CULTURE, ROUTINE: NORMAL
BUN BLDV-MCNC: 4 MG/DL (ref 6–20)
CALCIUM SERPL-MCNC: 8.6 MG/DL (ref 8.6–10)
CHLORIDE BLD-SCNC: 105 MMOL/L (ref 98–111)
CO2: 26 MMOL/L (ref 22–29)
CREAT SERPL-MCNC: 0.5 MG/DL (ref 0.5–0.9)
EOSINOPHILS ABSOLUTE: 0.4 K/UL (ref 0–0.6)
EOSINOPHILS RELATIVE PERCENT: 6.4 % (ref 0–5)
GFR AFRICAN AMERICAN: >59
GFR NON-AFRICAN AMERICAN: >60
GLUCOSE BLD-MCNC: 97 MG/DL (ref 74–109)
HCT VFR BLD CALC: 36.5 % (ref 37–47)
HEMOGLOBIN: 11.9 G/DL (ref 12–16)
IMMATURE GRANULOCYTES #: 0 K/UL
INR BLD: 1 (ref 0.88–1.18)
LYMPHOCYTES ABSOLUTE: 2.9 K/UL (ref 1.1–4.5)
LYMPHOCYTES RELATIVE PERCENT: 43.6 % (ref 20–40)
MCH RBC QN AUTO: 28.7 PG (ref 27–31)
MCHC RBC AUTO-ENTMCNC: 32.6 G/DL (ref 33–37)
MCV RBC AUTO: 88.2 FL (ref 81–99)
MONOCYTES ABSOLUTE: 0.4 K/UL (ref 0–0.9)
MONOCYTES RELATIVE PERCENT: 6.6 % (ref 0–10)
NEUTROPHILS ABSOLUTE: 2.9 K/UL (ref 1.5–7.5)
NEUTROPHILS RELATIVE PERCENT: 42.7 % (ref 50–65)
PDW BLD-RTO: 15.8 % (ref 11.5–14.5)
PLATELET # BLD: 109 K/UL (ref 130–400)
PMV BLD AUTO: 14 FL (ref 9.4–12.3)
POTASSIUM SERPL-SCNC: 4 MMOL/L (ref 3.5–5)
PROTHROMBIN TIME: 13.4 SEC (ref 12–14.6)
RBC # BLD: 4.14 M/UL (ref 4.2–5.4)
SODIUM BLD-SCNC: 140 MMOL/L (ref 136–145)
TOTAL PROTEIN: 5.3 G/DL (ref 6.6–8.7)
WBC # BLD: 6.7 K/UL (ref 4.8–10.8)

## 2022-01-01 PROCEDURE — 6360000002 HC RX W HCPCS: Performed by: HOSPITALIST

## 2022-01-01 PROCEDURE — 85025 COMPLETE CBC W/AUTO DIFF WBC: CPT

## 2022-01-01 PROCEDURE — 85610 PROTHROMBIN TIME: CPT

## 2022-01-01 PROCEDURE — 36415 COLL VENOUS BLD VENIPUNCTURE: CPT

## 2022-01-01 PROCEDURE — C9113 INJ PANTOPRAZOLE SODIUM, VIA: HCPCS | Performed by: HOSPITALIST

## 2022-01-01 PROCEDURE — 1210000000 HC MED SURG R&B

## 2022-01-01 PROCEDURE — 82248 BILIRUBIN DIRECT: CPT

## 2022-01-01 PROCEDURE — 2580000003 HC RX 258: Performed by: HOSPITALIST

## 2022-01-01 PROCEDURE — 99232 SBSQ HOSP IP/OBS MODERATE 35: CPT | Performed by: INTERNAL MEDICINE

## 2022-01-01 PROCEDURE — 80053 COMPREHEN METABOLIC PANEL: CPT

## 2022-01-01 RX ADMIN — SODIUM CHLORIDE, PRESERVATIVE FREE 10 ML: 5 INJECTION INTRAVENOUS at 22:44

## 2022-01-01 RX ADMIN — MEROPENEM AND SODIUM CHLORIDE 1000 MG: 1 INJECTION, SOLUTION INTRAVENOUS at 12:23

## 2022-01-01 RX ADMIN — ONDANSETRON 4 MG: 2 INJECTION INTRAMUSCULAR; INTRAVENOUS at 09:44

## 2022-01-01 RX ADMIN — HYDROMORPHONE HYDROCHLORIDE 1 MG: 1 INJECTION, SOLUTION INTRAMUSCULAR; INTRAVENOUS; SUBCUTANEOUS at 00:15

## 2022-01-01 RX ADMIN — ONDANSETRON 4 MG: 2 INJECTION INTRAMUSCULAR; INTRAVENOUS at 00:15

## 2022-01-01 RX ADMIN — SODIUM CHLORIDE, PRESERVATIVE FREE 10 ML: 5 INJECTION INTRAVENOUS at 07:51

## 2022-01-01 RX ADMIN — HYDROMORPHONE HYDROCHLORIDE 1 MG: 1 INJECTION, SOLUTION INTRAMUSCULAR; INTRAVENOUS; SUBCUTANEOUS at 04:46

## 2022-01-01 RX ADMIN — SODIUM CHLORIDE: 9 INJECTION, SOLUTION INTRAVENOUS at 07:53

## 2022-01-01 RX ADMIN — HYDROMORPHONE HYDROCHLORIDE 1 MG: 1 INJECTION, SOLUTION INTRAMUSCULAR; INTRAVENOUS; SUBCUTANEOUS at 14:03

## 2022-01-01 RX ADMIN — PANTOPRAZOLE SODIUM 40 MG: 40 INJECTION, POWDER, FOR SOLUTION INTRAVENOUS at 07:50

## 2022-01-01 RX ADMIN — HYDROMORPHONE HYDROCHLORIDE 1 MG: 1 INJECTION, SOLUTION INTRAMUSCULAR; INTRAVENOUS; SUBCUTANEOUS at 18:42

## 2022-01-01 RX ADMIN — PANTOPRAZOLE SODIUM 40 MG: 40 INJECTION, POWDER, FOR SOLUTION INTRAVENOUS at 21:23

## 2022-01-01 RX ADMIN — HYDROMORPHONE HYDROCHLORIDE 1 MG: 1 INJECTION, SOLUTION INTRAMUSCULAR; INTRAVENOUS; SUBCUTANEOUS at 09:44

## 2022-01-01 RX ADMIN — MEROPENEM AND SODIUM CHLORIDE 1000 MG: 1 INJECTION, SOLUTION INTRAVENOUS at 00:15

## 2022-01-01 ASSESSMENT — ENCOUNTER SYMPTOMS
VOMITING: 0
ABDOMINAL PAIN: 1
NAUSEA: 1

## 2022-01-01 ASSESSMENT — PAIN SCALES - GENERAL
PAINLEVEL_OUTOF10: 6
PAINLEVEL_OUTOF10: 0
PAINLEVEL_OUTOF10: 6
PAINLEVEL_OUTOF10: 0
PAINLEVEL_OUTOF10: 7
PAINLEVEL_OUTOF10: 10
PAINLEVEL_OUTOF10: 10

## 2022-01-01 ASSESSMENT — PAIN DESCRIPTION - LOCATION: LOCATION: ABDOMEN

## 2022-01-01 ASSESSMENT — PAIN DESCRIPTION - PAIN TYPE: TYPE: ACUTE PAIN

## 2022-01-01 NOTE — PROGRESS NOTES
[facility] Hospitalists      Patient:  Jaden Hawkins  YOB: 1980  Date of Service: 1/1/2022  MRN: [id number]   Acct: [de-identified]   Primary Care Physician: Christ Jacobsen MD  Advance Directive: Full Code  Admit Date: 12/27/2021       Hospital Day: 5  Portions of this note have been copied forward, however, changed to reflect the most current clinical status of this patient. CHIEF COMPLAINT Jaundice    SUBJECTIVE:  Still with nausea at time with clear liquid diet, continues with upper abdominal pain improved with medication, no vomiting or diarrhea. Denies problems with constipation    CUMULATIVE HOSPITAL COURSE: 41 yo female presented to [facility] ED on 12/27/2021 for evaluation of yellow skin/jaundice that she had reported beginning day of admission. She had reported  One week history of nonbloody/nonbilious emesis associated with epigastric abdominal pain and lethargy. She had denied any previous history of liver problems. She denied history of heavy ETOH use reporting that she drank occasional mixed drink. She reported history of \"fatty liver\" and had cholecystectomy in June due to cholelithiasis. Gastroenterology was consulted and evaluated patient 12/28. GI consultant requested possible transfer to facility with ERCP for questionable obstruction, however, [facility] [place] both are full. She has been put on the low acuity list at [facility] on 12/29/2021. MRCP was negative, US of liver performed. Gynecology consulted however will follow patient as outpatient. 12/30 NP hepatobiliary test with concern for partial distal common bile duct obstruction. Transfer center was called in attempt to transfer patient to [facility] for ERCP by Dr. Boris Yung. Transfer center related that she was accepted at [facility] when a bed is available however they would contact [facility] for possible transfer there. On 12/30 and 12/31, transfer center [facility] however no GI coverage.  Pt awaiting bed available at [facility]. Review of Systems:   Review of Systems   Constitutional: Negative for fever. Gastrointestinal: Positive for abdominal pain and nausea. Negative for vomiting. All other systems reviewed and are negative. 14 point review of systems is negative except as specifically addressed above. Objective:   VITALS:  /72   Pulse 56   Temp 97 °F (36.1 °C) (Temporal)   Resp 18   Ht 5' 5\" (1.651 m)   Wt 282 lb (127.9 kg)   SpO2 96%   BMI 46.93 kg/m²   24HR INTAKE/OUTPUT:      Intake/Output Summary (Last 24 hours) at 1/1/2022 0819  Last data filed at 1/1/2022 0353  Gross per 24 hour   Intake 2885 ml   Output    Net 2885 ml       Physical Exam  Vitals reviewed. Constitutional:       Comments: 42 yr old female, awake, alert and in no apparent distress   HENT:      Head: Normocephalic. Right Ear: External ear normal.      Left Ear: External ear normal.   Eyes:      General: Scleral icterus present. Conjunctiva/sclera: Conjunctivae normal.      Pupils: Pupils are equal, round, and reactive to light. Cardiovascular:      Rate and Rhythm: Normal rate and regular rhythm. Heart sounds: Normal heart sounds. Pulmonary:      Effort: Pulmonary effort is normal.      Breath sounds: Normal breath sounds. Abdominal:      General: Bowel sounds are normal.      Palpations: Abdomen is soft. Tenderness: There is no abdominal tenderness. Musculoskeletal:         General: Normal range of motion. Cervical back: Normal range of motion. Skin:     General: Skin is warm and dry. Coloration: Skin is jaundiced. Neurological:      Mental Status: She is alert and oriented to person, place, and time.              Medications:      sodium chloride 75 mL/hr at 01/01/22 0753    sodium chloride        meropenem  1,000 mg IntraVENous Q12H    pantoprazole  40 mg IntraVENous BID    And    sodium chloride (PF)  10 mL IntraVENous Daily    sodium chloride flush 5-40 mL IntraVENous 2 times per day    [Held by provider] enoxaparin  40 mg SubCUTAneous Daily     hydrocortisone, HYDROmorphone, diphenhydrAMINE, sodium chloride flush, sodium chloride, ondansetron **OR** ondansetron, polyethylene glycol  ADULT ORAL NUTRITION SUPPLEMENT; Breakfast, Lunch, Dinner; Clear Liquid Oral Supplement  ADULT DIET; Clear Liquid     Lab and other Data:     Recent Labs     12/30/21 0439 12/31/21 0309 01/01/22 0436   WBC 6.1 7.2 6.7   HGB 12.8 12.5 11.9*   * 119* 109*     Recent Labs     12/30/21 0439 12/31/21 0309 01/01/22 0436    141 140   K 4.0 4.1 4.0    105 105   CO2 22 25 26   BUN 9 5* 4*   CREATININE 0.4* 0.5 0.5   GLUCOSE 88 88 97     Recent Labs     12/30/21 0439 12/31/21 0309 01/01/22 0436   * 213* 168*   * 227* 215*   BILITOT 8.7* 7.2* 6.0*   ALKPHOS 246* 260* 252*     Troponin T: No results for input(s): TROPONINI in the last 72 hours. Pro-BNP: No results for input(s): BNP in the last 72 hours. INR:   Recent Labs     01/01/22 0436   INR 1.00       RAD:   NM HEPATOBILIARY    Result Date: 12/28/2021  1. Very minimal radiopharmaceutical in the proximal small bowel and nonvisualization of the biliary system may represent hepatobiliary dysfunction or at least partial, significant, distal common bile duct obstruction? . Signed by Dr Torres Lamas Additional Contrast? None    Result Date: 12/27/2021  1. Mild hepatic steatosis without focal liver lesions. The gallbladder surgically absent. No biliary ductal dilatation. 2. Mild splenomegaly. 3. Endometrium appears mildly thickened and irregular, correlate with patient presentation. 4. 2.8 cm fat-containing left adnexal mass, ovarian dermoid considered. GYN consultation recommended. 5. Otherwise, No CT evidence of acute intra-abdominal/pelvic pathological process. Signed by Dr Carlie Grant LIVER    Result Date: 12/28/2021  Unremarkable hepatic ultrasound.  No biliary ductal dilatation. Evidence of previous cholecystectomy. Signed by Dr Dalia Botello. Thanh    MRI ABDOMEN WO CONTRAST MRCP    Result Date: 12/28/2021  No acute abnormality, no intrahepatic or extrahepatic biliary ductal dilatation and no obvious choledocholithiasis. There is previous cholecystectomy. Normal appearance of the liver. A preliminary report was provided by the [facility] radiology service. There is no significant discrepancy with the preliminary report. Signed by Dr Dalia Botello. Thanh       Assessment/Plan   Active Problems:    Jaundice    Adnexal mass  Resolved Problems:    * No resolved hospital problems. *    Active Problems:    Jaundice              -Trend labs-liver synthetic fxn appears intact   -INR  1.0                      -Autoimmune hepatitis labs appear nl   -Acute hepatitis panel negative              -Await transfer to [facility]   -Gi following/Dr. Patrizia Dooley     -abnormal HIDA scan   -Dr. Ling Dodd available for ERCP next week              -follow labs                          Adnexal mass              -OB/GYN to follow-outpatient            Resolved Problems:    * No resolved hospital problems.  *        Antibiotic: Meropenem     DVT Prophylaxis:SCD's     GI prophylaxis: Protonix     Disposition: DAVID Dinh - CNP, 1/1/2022 8:19 AM

## 2022-01-01 NOTE — PROGRESS NOTES
GI  - PROGRESS NOTE    Subjective:   Admit Date: 12/27/2021  PCP: Vicky Raza MD    Patient being seen for: Jaundice, elevated liver function tests    24hr events/Interval History: No significant changes; slight decline in liver function tests     ADULT ORAL NUTRITION SUPPLEMENT; Breakfast, Lunch, Dinner; Clear Liquid Oral Supplement  ADULT DIET; Clear Liquid     Tolerated                  Pain:Mild  Nausea:None      Medications:  Scheduled Meds:   meropenem  1,000 mg IntraVENous Q12H    pantoprazole  40 mg IntraVENous BID    And    sodium chloride (PF)  10 mL IntraVENous Daily    sodium chloride flush  5-40 mL IntraVENous 2 times per day    [Held by provider] enoxaparin  40 mg SubCUTAneous Daily       Continuous Infusions:   sodium chloride 75 mL/hr at 01/01/22 0753    sodium chloride         PRN Meds:.hydrocortisone, HYDROmorphone, diphenhydrAMINE, sodium chloride flush, sodium chloride, ondansetron **OR** ondansetron, polyethylene glycol      Labs:     Recent Labs     12/30/21 0439 12/31/21 0309 01/01/22 0436   WBC 6.1 7.2 6.7   RBC 4.53 4.36 4.14*   HGB 12.8 12.5 11.9*   HCT 39.9 38.5 36.5*   MCV 88.1 88.3 88.2   MCH 28.3 28.7 28.7   MCHC 32.1* 32.5* 32.6*   * 119* 109*     Recent Labs     12/30/21 0439 12/31/21 0309 01/01/22 0436    141 140   K 4.0 4.1 4.0   ANIONGAP 11 11 9    105 105   CO2 22 25 26   BUN 9 5* 4*   CREATININE 0.4* 0.5 0.5   GLUCOSE 88 88 97   CALCIUM 8.7 8.7 8.6     No results for input(s): MG, PHOS in the last 72 hours.   Recent Labs     12/30/21 0439 12/31/21 0309 01/01/22 0436   * 213* 168*   * 227* 215*   BILITOT 8.7* 7.2* 6.0*   ALKPHOS 246* 260* 252*     HgBA1c:  No components found for: HGBA1C  FLP:    Lab Results   Component Value Date    TRIG 122 12/28/2021    HDL 35 12/28/2021    LDLCALC 246 12/28/2021     TSH:  No results found for: TSH  Troponin T: No results for input(s): TROPONINI in the last 72 hours. INR:   Recent Labs     01/01/22  0436   INR 1.00       No results for input(s): LIPASE in the last 72 hours. -----------------------------------------------------------------  RAD:       Physical Exam:     Vitals:    12/31/21 1954 12/31/21 2130 01/01/22 0306 01/01/22 0824   BP: 133/81  123/72 (!) 155/95   Pulse: 55 60 56 66   Resp: 18  18 16   Temp: 97.7 °F (36.5 °C)  97 °F (36.1 °C) 96.4 °F (35.8 °C)   TempSrc: Temporal  Temporal Temporal   SpO2: 96%  96% 97%   Weight:       Height:         24HR INTAKE/OUTPUT:      Intake/Output Summary (Last 24 hours) at 1/1/2022 1259  Last data filed at 1/1/2022 0944  Gross per 24 hour   Intake 2765 ml   Output    Net 2765 ml     General appearance: alert and cooperative with exam  Lungs: clear to auscultation bilaterally  Heart: regular rate and rhythm, S1, S2 normal, no murmur, click, rub or gallop  Abdomen: soft, non-tender; bowel sounds normal; no masses,  no organomegaly  Extremities: extremities normal, atraumatic, no cyanosis or edema  Neurologic: No obvious focal neurologic deficits. Impression:       Elevated liver function tests  Jaundice    Plan:       Patient with a decline in her liver function tests, however they are still elevated. At this time my recommendation is a EUS with ERCP with Dr. Shae Valle on Tuesday. If these are unrevealing recommend repeat liver biopsy to rule out autoimmune or hereditary etiology of patient's pain and lab abnormalities.

## 2022-01-02 LAB
ALBUMIN SERPL-MCNC: 3.6 G/DL (ref 3.5–5.2)
ALP BLD-CCNC: 283 U/L (ref 35–104)
ALT SERPL-CCNC: 209 U/L (ref 5–33)
ANION GAP SERPL CALCULATED.3IONS-SCNC: 11 MMOL/L (ref 7–19)
AST SERPL-CCNC: 146 U/L (ref 5–32)
BASOPHILS ABSOLUTE: 0 K/UL (ref 0–0.2)
BASOPHILS RELATIVE PERCENT: 0.4 % (ref 0–1)
BILIRUB SERPL-MCNC: 5.4 MG/DL (ref 0.2–1.2)
BILIRUBIN DIRECT: 3.5 MG/DL (ref 0–0.3)
BILIRUBIN, INDIRECT: 1.9 MG/DL (ref 0.1–1)
BUN BLDV-MCNC: 4 MG/DL (ref 6–20)
CALCIUM SERPL-MCNC: 8.8 MG/DL (ref 8.6–10)
CHLORIDE BLD-SCNC: 102 MMOL/L (ref 98–111)
CO2: 26 MMOL/L (ref 22–29)
CREAT SERPL-MCNC: 0.5 MG/DL (ref 0.5–0.9)
CULTURE, BLOOD 2: NORMAL
EOSINOPHILS ABSOLUTE: 0.4 K/UL (ref 0–0.6)
EOSINOPHILS RELATIVE PERCENT: 4.9 % (ref 0–5)
GFR AFRICAN AMERICAN: >59
GFR NON-AFRICAN AMERICAN: >60
GLUCOSE BLD-MCNC: 106 MG/DL (ref 74–109)
HCT VFR BLD CALC: 40.1 % (ref 37–47)
HEMOGLOBIN: 12.5 G/DL (ref 12–16)
IMMATURE GRANULOCYTES #: 0 K/UL
LYMPHOCYTES ABSOLUTE: 2.9 K/UL (ref 1.1–4.5)
LYMPHOCYTES RELATIVE PERCENT: 38.6 % (ref 20–40)
MCH RBC QN AUTO: 28 PG (ref 27–31)
MCHC RBC AUTO-ENTMCNC: 31.2 G/DL (ref 33–37)
MCV RBC AUTO: 89.9 FL (ref 81–99)
MONOCYTES ABSOLUTE: 0.4 K/UL (ref 0–0.9)
MONOCYTES RELATIVE PERCENT: 5.9 % (ref 0–10)
NEUTROPHILS ABSOLUTE: 3.7 K/UL (ref 1.5–7.5)
NEUTROPHILS RELATIVE PERCENT: 49.8 % (ref 50–65)
PDW BLD-RTO: 16.3 % (ref 11.5–14.5)
PLATELET # BLD: 132 K/UL (ref 130–400)
PMV BLD AUTO: 13.8 FL (ref 9.4–12.3)
POTASSIUM SERPL-SCNC: 3.9 MMOL/L (ref 3.5–5)
RBC # BLD: 4.46 M/UL (ref 4.2–5.4)
SODIUM BLD-SCNC: 139 MMOL/L (ref 136–145)
TOTAL PROTEIN: 6 G/DL (ref 6.6–8.7)
WBC # BLD: 7.5 K/UL (ref 4.8–10.8)

## 2022-01-02 PROCEDURE — 6360000002 HC RX W HCPCS: Performed by: HOSPITALIST

## 2022-01-02 PROCEDURE — 82248 BILIRUBIN DIRECT: CPT

## 2022-01-02 PROCEDURE — C9113 INJ PANTOPRAZOLE SODIUM, VIA: HCPCS | Performed by: HOSPITALIST

## 2022-01-02 PROCEDURE — 2580000003 HC RX 258: Performed by: HOSPITALIST

## 2022-01-02 PROCEDURE — 36415 COLL VENOUS BLD VENIPUNCTURE: CPT

## 2022-01-02 PROCEDURE — 80053 COMPREHEN METABOLIC PANEL: CPT

## 2022-01-02 PROCEDURE — 1210000000 HC MED SURG R&B

## 2022-01-02 PROCEDURE — 85025 COMPLETE CBC W/AUTO DIFF WBC: CPT

## 2022-01-02 RX ADMIN — MEROPENEM AND SODIUM CHLORIDE 1000 MG: 1 INJECTION, SOLUTION INTRAVENOUS at 00:11

## 2022-01-02 RX ADMIN — SODIUM CHLORIDE, PRESERVATIVE FREE 10 ML: 5 INJECTION INTRAVENOUS at 10:22

## 2022-01-02 RX ADMIN — HYDROMORPHONE HYDROCHLORIDE 1 MG: 1 INJECTION, SOLUTION INTRAMUSCULAR; INTRAVENOUS; SUBCUTANEOUS at 14:26

## 2022-01-02 RX ADMIN — SODIUM CHLORIDE, PRESERVATIVE FREE 10 ML: 5 INJECTION INTRAVENOUS at 10:27

## 2022-01-02 RX ADMIN — SODIUM CHLORIDE: 9 INJECTION, SOLUTION INTRAVENOUS at 14:26

## 2022-01-02 RX ADMIN — MEROPENEM AND SODIUM CHLORIDE 1000 MG: 1 INJECTION, SOLUTION INTRAVENOUS at 12:12

## 2022-01-02 RX ADMIN — PANTOPRAZOLE SODIUM 40 MG: 40 INJECTION, POWDER, FOR SOLUTION INTRAVENOUS at 10:21

## 2022-01-02 RX ADMIN — PANTOPRAZOLE SODIUM 40 MG: 40 INJECTION, POWDER, FOR SOLUTION INTRAVENOUS at 20:36

## 2022-01-02 RX ADMIN — HYDROMORPHONE HYDROCHLORIDE 1 MG: 1 INJECTION, SOLUTION INTRAMUSCULAR; INTRAVENOUS; SUBCUTANEOUS at 04:51

## 2022-01-02 RX ADMIN — ONDANSETRON 4 MG: 2 INJECTION INTRAMUSCULAR; INTRAVENOUS at 10:21

## 2022-01-02 RX ADMIN — HYDROMORPHONE HYDROCHLORIDE 1 MG: 1 INJECTION, SOLUTION INTRAMUSCULAR; INTRAVENOUS; SUBCUTANEOUS at 18:37

## 2022-01-02 RX ADMIN — ONDANSETRON 4 MG: 2 INJECTION INTRAMUSCULAR; INTRAVENOUS at 18:41

## 2022-01-02 RX ADMIN — HYDROMORPHONE HYDROCHLORIDE 1 MG: 1 INJECTION, SOLUTION INTRAMUSCULAR; INTRAVENOUS; SUBCUTANEOUS at 22:44

## 2022-01-02 RX ADMIN — HYDROMORPHONE HYDROCHLORIDE 1 MG: 1 INJECTION, SOLUTION INTRAMUSCULAR; INTRAVENOUS; SUBCUTANEOUS at 10:21

## 2022-01-02 RX ADMIN — MEROPENEM AND SODIUM CHLORIDE 1000 MG: 1 INJECTION, SOLUTION INTRAVENOUS at 23:36

## 2022-01-02 RX ADMIN — HYDROMORPHONE HYDROCHLORIDE 1 MG: 1 INJECTION, SOLUTION INTRAMUSCULAR; INTRAVENOUS; SUBCUTANEOUS at 00:12

## 2022-01-02 ASSESSMENT — PAIN SCALES - GENERAL
PAINLEVEL_OUTOF10: 3
PAINLEVEL_OUTOF10: 4
PAINLEVEL_OUTOF10: 7
PAINLEVEL_OUTOF10: 8
PAINLEVEL_OUTOF10: 3
PAINLEVEL_OUTOF10: 8
PAINLEVEL_OUTOF10: 8
PAINLEVEL_OUTOF10: 3

## 2022-01-02 ASSESSMENT — ENCOUNTER SYMPTOMS
NAUSEA: 1
VOMITING: 0
ABDOMINAL PAIN: 1

## 2022-01-02 ASSESSMENT — PAIN DESCRIPTION - FREQUENCY: FREQUENCY: INTERMITTENT

## 2022-01-02 ASSESSMENT — PAIN DESCRIPTION - DIRECTION: RADIATING_TOWARDS: NO

## 2022-01-02 ASSESSMENT — PAIN DESCRIPTION - LOCATION: LOCATION: ABDOMEN

## 2022-01-02 ASSESSMENT — PAIN DESCRIPTION - DESCRIPTORS: DESCRIPTORS: SHARP

## 2022-01-02 ASSESSMENT — PAIN DESCRIPTION - ONSET: ONSET: ON-GOING

## 2022-01-02 ASSESSMENT — PAIN DESCRIPTION - ORIENTATION: ORIENTATION: UPPER

## 2022-01-02 ASSESSMENT — PAIN DESCRIPTION - PROGRESSION: CLINICAL_PROGRESSION: NOT CHANGED

## 2022-01-02 ASSESSMENT — PAIN DESCRIPTION - PAIN TYPE: TYPE: ACUTE PAIN

## 2022-01-02 ASSESSMENT — PAIN - FUNCTIONAL ASSESSMENT: PAIN_FUNCTIONAL_ASSESSMENT: ACTIVITIES ARE NOT PREVENTED

## 2022-01-02 NOTE — PROGRESS NOTES
[facility] Hospitalists      Patient:  Jaden Hawkins  YOB: 1980  Date of Service: 1/2/2022  MRN: [id number]   Acct: [de-identified]   Primary Care Physician: Christ Jacobsen MD  Advance Directive: Full Code  Admit Date: 12/27/2021       Hospital Day: 6  Portions of this note have been copied forward, however, changed to reflect the most current clinical status of this patient. CHIEF COMPLAINT Jaundice    SUBJECTIVE: Patient feeling a little better today. Tolerating her diet. No major issues. Still awaiting bed for placement. CUMULATIVE HOSPITAL COURSE: 43 yo female presented to [facility] ED on 12/27/2021 for evaluation of yellow skin/jaundice that she had reported beginning day of admission. She had reported  One week history of nonbloody/nonbilious emesis associated with epigastric abdominal pain and lethargy. She had denied any previous history of liver problems. She denied history of heavy ETOH use reporting that she drank occasional mixed drink. She reported history of \"fatty liver\" and had cholecystectomy in June due to cholelithiasis. Gastroenterology was consulted and evaluated patient 12/28. GI consultant requested possible transfer to facility with ERCP for questionable obstruction, however, [facility] [place] both are full. She has been put on the low acuity list at [facility] on 12/29/2021. MRCP was negative, US of liver performed. Gynecology consulted however will follow patient as outpatient. 12/30 NP hepatobiliary test with concern for partial distal common bile duct obstruction. Transfer center was called in attempt to transfer patient to [facility] for ERCP by Dr. Boris Yung. Transfer center related that she was accepted at [facility] when a bed is available however they would contact [facility] for possible transfer there. On 12/30 and 12/31, transfer center contacted [facility] however no GI coverage. Pt awaiting bed available at [facility].      Review of Systems: Review of Systems   Constitutional: Negative for fever. Gastrointestinal: Positive for abdominal pain and nausea. Negative for vomiting. All other systems reviewed and are negative. 14 point review of systems is negative except as specifically addressed above. Objective:   VITALS:  /78   Pulse 63   Temp 97.2 °F (36.2 °C) (Temporal)   Resp 18   Ht 5' 5\" (1.651 m)   Wt 282 lb (127.9 kg)   SpO2 99%   BMI 46.93 kg/m²   24HR INTAKE/OUTPUT:      Intake/Output Summary (Last 24 hours) at 1/2/2022 1723  Last data filed at 1/2/2022 1442  Gross per 24 hour   Intake 2060 ml   Output    Net 2060 ml       Physical Exam  Vitals reviewed. Constitutional:       Comments: 42 yr old female, awake, alert and in no apparent distress   HENT:      Head: Normocephalic. Right Ear: External ear normal.      Left Ear: External ear normal.   Eyes:      General: Scleral icterus present. Conjunctiva/sclera: Conjunctivae normal.      Pupils: Pupils are equal, round, and reactive to light. Cardiovascular:      Rate and Rhythm: Normal rate and regular rhythm. Heart sounds: Normal heart sounds. Pulmonary:      Effort: Pulmonary effort is normal.      Breath sounds: Normal breath sounds. Abdominal:      General: Bowel sounds are normal.      Palpations: Abdomen is soft. Tenderness: There is no abdominal tenderness. Musculoskeletal:         General: Normal range of motion. Cervical back: Normal range of motion. Skin:     General: Skin is warm and dry. Coloration: Skin is jaundiced. Neurological:      Mental Status: She is alert and oriented to person, place, and time.              Medications:      sodium chloride 75 mL/hr at 01/02/22 1426    sodium chloride        meropenem  1,000 mg IntraVENous Q12H    pantoprazole  40 mg IntraVENous BID    And    sodium chloride (PF)  10 mL IntraVENous Daily    sodium chloride flush  5-40 mL IntraVENous 2 times per day    [Held by provider] enoxaparin  40 mg SubCUTAneous Daily     hydrocortisone, HYDROmorphone, diphenhydrAMINE, sodium chloride flush, sodium chloride, ondansetron **OR** ondansetron, polyethylene glycol  ADULT ORAL NUTRITION SUPPLEMENT; Breakfast, Lunch, Dinner; Clear Liquid Oral Supplement  ADULT DIET; Full Liquid     Lab and other Data:     Recent Labs     12/31/21  0309 01/01/22  0436 01/02/22  0845   WBC 7.2 6.7 7.5   HGB 12.5 11.9* 12.5   * 109* 132     Recent Labs     12/31/21  0309 01/01/22  0436 01/02/22  0845    140 139   K 4.1 4.0 3.9    105 102   CO2 25 26 26   BUN 5* 4* 4*   CREATININE 0.5 0.5 0.5   GLUCOSE 88 97 106     Recent Labs     12/31/21  0309 01/01/22  0436 01/02/22  0845   * 168* 146*   * 215* 209*   BILITOT 7.2* 6.0* 5.4*   ALKPHOS 260* 252* 283*     Troponin T: No results for input(s): TROPONINI in the last 72 hours. Pro-BNP: No results for input(s): BNP in the last 72 hours. INR:   Recent Labs     01/01/22 0436   INR 1.00       RAD:   NM HEPATOBILIARY    Result Date: 12/28/2021  1. Very minimal radiopharmaceutical in the proximal small bowel and nonvisualization of the biliary system may represent hepatobiliary dysfunction or at least partial, significant, distal common bile duct obstruction? . Signed by Dr Cristofer Ellis Additional Contrast? None    Result Date: 12/27/2021  1. Mild hepatic steatosis without focal liver lesions. The gallbladder surgically absent. No biliary ductal dilatation. 2. Mild splenomegaly. 3. Endometrium appears mildly thickened and irregular, correlate with patient presentation. 4. 2.8 cm fat-containing left adnexal mass, ovarian dermoid considered. GYN consultation recommended. 5. Otherwise, No CT evidence of acute intra-abdominal/pelvic pathological process. Signed by Dr Wesley Or LIVER    Result Date: 12/28/2021  Unremarkable hepatic ultrasound. No biliary ductal dilatation.  Evidence of previous cholecystectomy. Signed by Dr Ernesto Law    MRI ABDOMEN WO CONTRAST MRCP    Result Date: 12/28/2021  No acute abnormality, no intrahepatic or extrahepatic biliary ductal dilatation and no obvious choledocholithiasis. There is previous cholecystectomy. Normal appearance of the liver. A preliminary report was provided by the [facility] radiology service. There is no significant discrepancy with the preliminary report. Signed by Dr Ernesto Bo. Thanh       Assessment/Plan   Active Problems:    Jaundice    Adnexal mass  Resolved Problems:    * No resolved hospital problems. *    Active Problems:    Jaundice              -Trend labs-liver synthetic fxn appears intact   -INR  1.0                      -Autoimmune hepatitis labs appear nl   -Acute hepatitis panel negative              -Await transfer to [facility]   -Gi following     -abnormal HIDA scan   -Dr. Luna Reyna available for ERCP next week              -follow labs    -Patient bilirubin level is slowly downtrending   -Monitor LFTs closely   -Patient tolerating clear liquid diet, advance to full liquid diet                          Adnexal mass              -OB/GYN to follow-outpatient            Resolved Problems:    * No resolved hospital problems.  *        Antibiotic: Meropenem     DVT Prophylaxis:SCD's     GI prophylaxis: Protonix     Disposition: LANDRY Castillo MD, 1/2/2022 5:23 PM

## 2022-01-02 NOTE — PROGRESS NOTES
This RN spoke with nurse at [facility]. Per Wolf Chatterjee, still no available bed at [facility] they will continue to update.     Electronically signed by Beverley Ferguson RN on 1/1/22 at 7:56 PM CST

## 2022-01-03 LAB
ALBUMIN SERPL-MCNC: 3.3 G/DL (ref 3.5–5.2)
ALP BLD-CCNC: 241 U/L (ref 35–104)
ALT SERPL-CCNC: 176 U/L (ref 5–33)
ANION GAP SERPL CALCULATED.3IONS-SCNC: 11 MMOL/L (ref 7–19)
AST SERPL-CCNC: 121 U/L (ref 5–32)
BASOPHILS ABSOLUTE: 0 K/UL (ref 0–0.2)
BASOPHILS RELATIVE PERCENT: 0 % (ref 0–1)
BILIRUB SERPL-MCNC: 3.2 MG/DL (ref 0.2–1.2)
BILIRUBIN DIRECT: 2 MG/DL (ref 0–0.3)
BILIRUBIN, INDIRECT: 1.2 MG/DL (ref 0.1–1)
BUN BLDV-MCNC: 4 MG/DL (ref 6–20)
CALCIUM SERPL-MCNC: 8.6 MG/DL (ref 8.6–10)
CHLORIDE BLD-SCNC: 105 MMOL/L (ref 98–111)
CO2: 25 MMOL/L (ref 22–29)
CREAT SERPL-MCNC: 0.5 MG/DL (ref 0.5–0.9)
EOSINOPHILS ABSOLUTE: 0.38 K/UL (ref 0–0.6)
EOSINOPHILS RELATIVE PERCENT: 5 % (ref 0–5)
GFR AFRICAN AMERICAN: >59
GFR NON-AFRICAN AMERICAN: >60
GLUCOSE BLD-MCNC: 100 MG/DL (ref 74–109)
HCT VFR BLD CALC: 36 % (ref 37–47)
HEMOGLOBIN: 11.5 G/DL (ref 12–16)
IMMATURE GRANULOCYTES #: 0 K/UL
LYMPHOCYTES ABSOLUTE: 3.3 K/UL (ref 1.1–4.5)
LYMPHOCYTES RELATIVE PERCENT: 44 % (ref 20–40)
MCH RBC QN AUTO: 28.8 PG (ref 27–31)
MCHC RBC AUTO-ENTMCNC: 31.9 G/DL (ref 33–37)
MCV RBC AUTO: 90.2 FL (ref 81–99)
MONOCYTES ABSOLUTE: 0.5 K/UL (ref 0–0.9)
MONOCYTES RELATIVE PERCENT: 7 % (ref 0–10)
NEUTROPHILS ABSOLUTE: 3.3 K/UL (ref 1.5–7.5)
NEUTROPHILS RELATIVE PERCENT: 44 % (ref 50–65)
PDW BLD-RTO: 15.9 % (ref 11.5–14.5)
PLATELET # BLD: 109 K/UL (ref 130–400)
PLATELET SLIDE REVIEW: ABNORMAL
PMV BLD AUTO: 13.7 FL (ref 9.4–12.3)
POTASSIUM SERPL-SCNC: 3.7 MMOL/L (ref 3.5–5)
RBC # BLD: 3.99 M/UL (ref 4.2–5.4)
SODIUM BLD-SCNC: 141 MMOL/L (ref 136–145)
TOTAL PROTEIN: 5.5 G/DL (ref 6.6–8.7)
WBC # BLD: 7.6 K/UL (ref 4.8–10.8)

## 2022-01-03 PROCEDURE — 85025 COMPLETE CBC W/AUTO DIFF WBC: CPT

## 2022-01-03 PROCEDURE — 82248 BILIRUBIN DIRECT: CPT

## 2022-01-03 PROCEDURE — 6360000002 HC RX W HCPCS: Performed by: HOSPITALIST

## 2022-01-03 PROCEDURE — 36415 COLL VENOUS BLD VENIPUNCTURE: CPT

## 2022-01-03 PROCEDURE — 1210000000 HC MED SURG R&B

## 2022-01-03 PROCEDURE — 99232 SBSQ HOSP IP/OBS MODERATE 35: CPT | Performed by: SPECIALIST

## 2022-01-03 PROCEDURE — 80053 COMPREHEN METABOLIC PANEL: CPT

## 2022-01-03 PROCEDURE — C9113 INJ PANTOPRAZOLE SODIUM, VIA: HCPCS | Performed by: HOSPITALIST

## 2022-01-03 PROCEDURE — 2580000003 HC RX 258: Performed by: HOSPITALIST

## 2022-01-03 RX ADMIN — ONDANSETRON 4 MG: 2 INJECTION INTRAMUSCULAR; INTRAVENOUS at 20:05

## 2022-01-03 RX ADMIN — PANTOPRAZOLE SODIUM 40 MG: 40 INJECTION, POWDER, FOR SOLUTION INTRAVENOUS at 20:04

## 2022-01-03 RX ADMIN — ONDANSETRON 4 MG: 2 INJECTION INTRAMUSCULAR; INTRAVENOUS at 02:46

## 2022-01-03 RX ADMIN — HYDROMORPHONE HYDROCHLORIDE 1 MG: 1 INJECTION, SOLUTION INTRAMUSCULAR; INTRAVENOUS; SUBCUTANEOUS at 11:29

## 2022-01-03 RX ADMIN — SODIUM CHLORIDE, PRESERVATIVE FREE 10 ML: 5 INJECTION INTRAVENOUS at 07:18

## 2022-01-03 RX ADMIN — HYDROMORPHONE HYDROCHLORIDE 1 MG: 1 INJECTION, SOLUTION INTRAMUSCULAR; INTRAVENOUS; SUBCUTANEOUS at 20:05

## 2022-01-03 RX ADMIN — PANTOPRAZOLE SODIUM 40 MG: 40 INJECTION, POWDER, FOR SOLUTION INTRAVENOUS at 07:18

## 2022-01-03 RX ADMIN — SODIUM CHLORIDE, PRESERVATIVE FREE 10 ML: 5 INJECTION INTRAVENOUS at 20:05

## 2022-01-03 RX ADMIN — MEROPENEM AND SODIUM CHLORIDE 1000 MG: 1 INJECTION, SOLUTION INTRAVENOUS at 11:31

## 2022-01-03 RX ADMIN — SODIUM CHLORIDE: 9 INJECTION, SOLUTION INTRAVENOUS at 18:05

## 2022-01-03 RX ADMIN — SODIUM CHLORIDE: 9 INJECTION, SOLUTION INTRAVENOUS at 02:48

## 2022-01-03 RX ADMIN — HYDROMORPHONE HYDROCHLORIDE 1 MG: 1 INJECTION, SOLUTION INTRAMUSCULAR; INTRAVENOUS; SUBCUTANEOUS at 02:46

## 2022-01-03 RX ADMIN — HYDROMORPHONE HYDROCHLORIDE 1 MG: 1 INJECTION, SOLUTION INTRAMUSCULAR; INTRAVENOUS; SUBCUTANEOUS at 15:47

## 2022-01-03 RX ADMIN — HYDROMORPHONE HYDROCHLORIDE 1 MG: 1 INJECTION, SOLUTION INTRAMUSCULAR; INTRAVENOUS; SUBCUTANEOUS at 07:17

## 2022-01-03 ASSESSMENT — PAIN DESCRIPTION - PAIN TYPE
TYPE: ACUTE PAIN
TYPE: ACUTE PAIN

## 2022-01-03 ASSESSMENT — ENCOUNTER SYMPTOMS
ABDOMINAL PAIN: 1
VOMITING: 0
NAUSEA: 1

## 2022-01-03 ASSESSMENT — PAIN - FUNCTIONAL ASSESSMENT: PAIN_FUNCTIONAL_ASSESSMENT: ACTIVITIES ARE NOT PREVENTED

## 2022-01-03 ASSESSMENT — PAIN SCALES - GENERAL
PAINLEVEL_OUTOF10: 7
PAINLEVEL_OUTOF10: 7
PAINLEVEL_OUTOF10: 0
PAINLEVEL_OUTOF10: 7
PAINLEVEL_OUTOF10: 6
PAINLEVEL_OUTOF10: 6
PAINLEVEL_OUTOF10: 4

## 2022-01-03 ASSESSMENT — PAIN DESCRIPTION - LOCATION
LOCATION: ABDOMEN
LOCATION: ABDOMEN

## 2022-01-03 ASSESSMENT — PAIN DESCRIPTION - PROGRESSION: CLINICAL_PROGRESSION: NOT CHANGED

## 2022-01-03 ASSESSMENT — PAIN DESCRIPTION - FREQUENCY: FREQUENCY: INTERMITTENT

## 2022-01-03 ASSESSMENT — PAIN DESCRIPTION - DESCRIPTORS: DESCRIPTORS: DISCOMFORT

## 2022-01-03 ASSESSMENT — PAIN DESCRIPTION - ORIENTATION
ORIENTATION: UPPER
ORIENTATION: UPPER

## 2022-01-03 ASSESSMENT — PAIN DESCRIPTION - ONSET: ONSET: ON-GOING

## 2022-01-03 NOTE — PROGRESS NOTES
Progress Note  Date:1/3/2022       Room:[facility]3/503-01  Patient Name:Anju Ferrell     YOB: 1980     Age:41 y.o. Chaperone Indiana Mcgraw RN    Subjective    Subjective bili is still 3.2. Alkaline phosphatase 241. Even though the LFTs have improved but have not normalized. I had seen the patient last week. Dr. North Urias has been seeing the patient until today. Dr. North Urias was also recommending EUS with ERCP. Patient complains of mild right upper quadrant pain. Denies fever or chills. Her LFTs have improved but they are not normal yet. She is on antibiotic, Merrem. Review of Systems no fever or chills. Objective         Vitals Last 24 Hours:  TEMPERATURE:  Temp  Av °F (36.1 °C)  Min: 96.1 °F (35.6 °C)  Max: 98.1 °F (36.7 °C)  RESPIRATIONS RANGE: Resp  Av.7  Min: 16  Max: 18  PULSE OXIMETRY RANGE: SpO2  Av.3 %  Min: 97 %  Max: 98 %  PULSE RANGE: Pulse  Av.3  Min: 57  Max: 61  BLOOD PRESSURE RANGE: Systolic (84EHU), RHI:233 , Min:110 , CCO:826   ; Diastolic (60SLQ), WST:25, Min:52, Max:81    I/O (24Hr): Intake/Output Summary (Last 24 hours) at 1/3/2022 1624  Last data filed at 1/3/2022 1340  Gross per 24 hour   Intake 1426.87 ml   Output    Net 1426.87 ml     Objective     Alert and oriented. In no acute physical distress. HEENT normocephalic. Neck supple. Abdomen obese, soft, nontender. No peritoneal signs. No gross organomegaly. No pedal edema. Skin good turgor.   Labs/Imaging/Diagnostics    Labs:  CBC:  Recent Labs     22  0436 22  0845 22  0508   WBC 6.7 7.5 7.6   RBC 4.14* 4.46 3.99*   HGB 11.9* 12.5 11.5*   HCT 36.5* 40.1 36.0*   MCV 88.2 89.9 90.2   RDW 15.8* 16.3* 15.9*   * 132 109*     CHEMISTRIES:  Recent Labs     22  0436 22  0845 22  0508    139 141   K 4.0 3.9 3.7    102 105   CO2 26 26 25   BUN 4* 4* 4*   CREATININE 0.5 0.5 0.5   GLUCOSE 97 106 100     PT/INR:  Recent Labs     22  0436   PROTIME 13.4   INR 1.00     APTT:No results for input(s): APTT in the last 72 hours. LIVER PROFILE:  Recent Labs     01/01/22  0436 01/02/22  0845 01/03/22  0508   * 146* 121*   * 209* 176*   BILIDIR 4.3* 3.5* 2.0*   BILITOT 6.0* 5.4* 3.2*   ALKPHOS 252* 283* 241*       Imaging Last 24 Hours:  No results found. Assessment//Plan           Hospital Problems           Last Modified POA    Jaundice 12/27/2021 Yes    Adnexal mass 12/28/2021 Yes    Overview Signed 12/28/2021  9:00 AM by Lisa Clayton MD     CT ABD/Pelvis (12/27/2021): 2.8 cm fat-containing left adnexal mass, ovarian dermoid considered. Assessment & Plan     Abnormal LFTs. LFTs have improved but not normalized yet. Bilirubin is still 3.2. She is on Merrem antibiotic. No extrahepatic biliary obstruction by MRCP, ultrasound and CAT scan of the abdomen. All these modalities can miss sludge or stones in the CBD. Even though imaging does not show any CBD stones, ultrasound and CT both can miss common bile duct stone in about 50% time. MRCP can miss CBD stones or sludge in 5 to 10% of time. Patient did come with biliary colic. Patient's previous liver biopsy does not show any underlying liver disease. Viral hepatitis has been ruled out.      Acute hepatitis serology was negative on 12/27/2021 for hepatitis A, B, C.     2.8 cm fat-containing left adnexal mass. Suggest GYN evaluation as per hospitalist medicine. Plan:    Trying to contact Dr. Molly Adam to evaluate the patient for upper EUS and ERCP. awaiting callback from Dr. Molly Adam. Discussed plan with patient and her nurse.     Electronically signed by Rogers Maravilla MD on 1/3/22 at 4:24 PM CST

## 2022-01-04 ENCOUNTER — ANESTHESIA (OUTPATIENT)
Dept: ENDOSCOPY | Age: 42
DRG: 442 | End: 2022-01-04
Payer: COMMERCIAL

## 2022-01-04 ENCOUNTER — ANESTHESIA EVENT (OUTPATIENT)
Dept: ENDOSCOPY | Age: 42
DRG: 442 | End: 2022-01-04
Payer: COMMERCIAL

## 2022-01-04 VITALS — SYSTOLIC BLOOD PRESSURE: 154 MMHG | TEMPERATURE: 97 F | DIASTOLIC BLOOD PRESSURE: 85 MMHG | OXYGEN SATURATION: 100 %

## 2022-01-04 VITALS
WEIGHT: 282 LBS | RESPIRATION RATE: 18 BRPM | SYSTOLIC BLOOD PRESSURE: 165 MMHG | DIASTOLIC BLOOD PRESSURE: 75 MMHG | HEART RATE: 64 BPM | TEMPERATURE: 96.3 F | HEIGHT: 65 IN | OXYGEN SATURATION: 99 % | BODY MASS INDEX: 46.98 KG/M2

## 2022-01-04 PROBLEM — E66.01 MORBID OBESITY (HCC): Status: ACTIVE | Noted: 2022-01-04

## 2022-01-04 PROBLEM — K83.1 OBSTRUCTIVE JAUNDICE: Status: ACTIVE | Noted: 2022-01-04

## 2022-01-04 PROBLEM — R79.89 ELEVATED LFTS: Status: ACTIVE | Noted: 2022-01-04

## 2022-01-04 PROBLEM — F17.210 DEPENDENCE ON NICOTINE FROM CIGARETTES: Status: ACTIVE | Noted: 2022-01-04

## 2022-01-04 PROBLEM — Z71.6 TOBACCO ABUSE COUNSELING: Status: ACTIVE | Noted: 2022-01-04

## 2022-01-04 LAB
ALBUMIN SERPL-MCNC: 3.4 G/DL (ref 3.5–5.2)
ALP BLD-CCNC: 233 U/L (ref 35–104)
ALT SERPL-CCNC: 161 U/L (ref 5–33)
ANION GAP SERPL CALCULATED.3IONS-SCNC: 10 MMOL/L (ref 7–19)
AST SERPL-CCNC: 108 U/L (ref 5–32)
BASOPHILS ABSOLUTE: 0.1 K/UL (ref 0–0.2)
BASOPHILS RELATIVE PERCENT: 0.6 % (ref 0–1)
BILIRUB SERPL-MCNC: 2.5 MG/DL (ref 0.2–1.2)
BILIRUBIN DIRECT: 1.5 MG/DL (ref 0–0.3)
BILIRUBIN, INDIRECT: 1 MG/DL (ref 0.1–1)
BUN BLDV-MCNC: 8 MG/DL (ref 6–20)
CALCIUM SERPL-MCNC: 8.7 MG/DL (ref 8.6–10)
CHLORIDE BLD-SCNC: 103 MMOL/L (ref 98–111)
CO2: 26 MMOL/L (ref 22–29)
CREAT SERPL-MCNC: 0.5 MG/DL (ref 0.5–0.9)
EOSINOPHILS ABSOLUTE: 0.3 K/UL (ref 0–0.6)
EOSINOPHILS RELATIVE PERCENT: 4.3 % (ref 0–5)
GFR AFRICAN AMERICAN: >59
GFR NON-AFRICAN AMERICAN: >60
GLUCOSE BLD-MCNC: 113 MG/DL (ref 74–109)
HCT VFR BLD CALC: 36.7 % (ref 37–47)
HEMOGLOBIN: 11.7 G/DL (ref 12–16)
IMMATURE GRANULOCYTES #: 0 K/UL
LYMPHOCYTES ABSOLUTE: 3.4 K/UL (ref 1.1–4.5)
LYMPHOCYTES RELATIVE PERCENT: 42.8 % (ref 20–40)
MCH RBC QN AUTO: 28.6 PG (ref 27–31)
MCHC RBC AUTO-ENTMCNC: 31.9 G/DL (ref 33–37)
MCV RBC AUTO: 89.7 FL (ref 81–99)
MONOCYTES ABSOLUTE: 0.6 K/UL (ref 0–0.9)
MONOCYTES RELATIVE PERCENT: 7.1 % (ref 0–10)
NEUTROPHILS ABSOLUTE: 3.6 K/UL (ref 1.5–7.5)
NEUTROPHILS RELATIVE PERCENT: 44.8 % (ref 50–65)
PDW BLD-RTO: 15.8 % (ref 11.5–14.5)
PLATELET # BLD: 112 K/UL (ref 130–400)
POTASSIUM SERPL-SCNC: 4 MMOL/L (ref 3.5–5)
RBC # BLD: 4.09 M/UL (ref 4.2–5.4)
SODIUM BLD-SCNC: 139 MMOL/L (ref 136–145)
TOTAL PROTEIN: 5.3 G/DL (ref 6.6–8.7)
WBC # BLD: 7.9 K/UL (ref 4.8–10.8)

## 2022-01-04 PROCEDURE — 2500000003 HC RX 250 WO HCPCS

## 2022-01-04 PROCEDURE — 6360000002 HC RX W HCPCS: Performed by: HOSPITALIST

## 2022-01-04 PROCEDURE — 3700000001 HC ADD 15 MINUTES (ANESTHESIA): Performed by: INTERNAL MEDICINE

## 2022-01-04 PROCEDURE — 6360000002 HC RX W HCPCS

## 2022-01-04 PROCEDURE — C1769 GUIDE WIRE: HCPCS | Performed by: INTERNAL MEDICINE

## 2022-01-04 PROCEDURE — 2580000003 HC RX 258

## 2022-01-04 PROCEDURE — 7100000000 HC PACU RECOVERY - FIRST 15 MIN: Performed by: INTERNAL MEDICINE

## 2022-01-04 PROCEDURE — 43259 EGD US EXAM DUODENUM/JEJUNUM: CPT | Performed by: INTERNAL MEDICINE

## 2022-01-04 PROCEDURE — 80053 COMPREHEN METABOLIC PANEL: CPT

## 2022-01-04 PROCEDURE — 0DJ08ZZ INSPECTION OF UPPER INTESTINAL TRACT, VIA NATURAL OR ARTIFICIAL OPENING ENDOSCOPIC: ICD-10-PCS | Performed by: INTERNAL MEDICINE

## 2022-01-04 PROCEDURE — C9113 INJ PANTOPRAZOLE SODIUM, VIA: HCPCS | Performed by: HOSPITALIST

## 2022-01-04 PROCEDURE — 82248 BILIRUBIN DIRECT: CPT

## 2022-01-04 PROCEDURE — 3609018500 HC EGD US SCOPE W/ADJACENT STRUCTURES: Performed by: INTERNAL MEDICINE

## 2022-01-04 PROCEDURE — 2709999900 HC NON-CHARGEABLE SUPPLY: Performed by: INTERNAL MEDICINE

## 2022-01-04 PROCEDURE — 3700000000 HC ANESTHESIA ATTENDED CARE: Performed by: INTERNAL MEDICINE

## 2022-01-04 PROCEDURE — 85025 COMPLETE CBC W/AUTO DIFF WBC: CPT

## 2022-01-04 PROCEDURE — 2580000003 HC RX 258: Performed by: HOSPITALIST

## 2022-01-04 RX ORDER — FENTANYL CITRATE 50 UG/ML
INJECTION, SOLUTION INTRAMUSCULAR; INTRAVENOUS PRN
Status: DISCONTINUED | OUTPATIENT
Start: 2022-01-04 | End: 2022-01-04 | Stop reason: SDUPTHER

## 2022-01-04 RX ORDER — SODIUM CHLORIDE, SODIUM LACTATE, POTASSIUM CHLORIDE, CALCIUM CHLORIDE 600; 310; 30; 20 MG/100ML; MG/100ML; MG/100ML; MG/100ML
INJECTION, SOLUTION INTRAVENOUS CONTINUOUS PRN
Status: DISCONTINUED | OUTPATIENT
Start: 2022-01-04 | End: 2022-01-04 | Stop reason: SDUPTHER

## 2022-01-04 RX ORDER — LIDOCAINE HYDROCHLORIDE 10 MG/ML
INJECTION, SOLUTION EPIDURAL; INFILTRATION; INTRACAUDAL; PERINEURAL PRN
Status: DISCONTINUED | OUTPATIENT
Start: 2022-01-04 | End: 2022-01-04 | Stop reason: SDUPTHER

## 2022-01-04 RX ORDER — PROPOFOL 10 MG/ML
INJECTION, EMULSION INTRAVENOUS PRN
Status: DISCONTINUED | OUTPATIENT
Start: 2022-01-04 | End: 2022-01-04 | Stop reason: SDUPTHER

## 2022-01-04 RX ORDER — PROPOFOL 10 MG/ML
INJECTION, EMULSION INTRAVENOUS CONTINUOUS PRN
Status: DISCONTINUED | OUTPATIENT
Start: 2022-01-04 | End: 2022-01-04 | Stop reason: SDUPTHER

## 2022-01-04 RX ADMIN — PROPOFOL 100 MG: 10 INJECTION, EMULSION INTRAVENOUS at 15:38

## 2022-01-04 RX ADMIN — HYDROMORPHONE HYDROCHLORIDE 1 MG: 1 INJECTION, SOLUTION INTRAMUSCULAR; INTRAVENOUS; SUBCUTANEOUS at 16:48

## 2022-01-04 RX ADMIN — SODIUM CHLORIDE: 9 INJECTION, SOLUTION INTRAVENOUS at 08:00

## 2022-01-04 RX ADMIN — PANTOPRAZOLE SODIUM 40 MG: 40 INJECTION, POWDER, FOR SOLUTION INTRAVENOUS at 08:49

## 2022-01-04 RX ADMIN — HYDROMORPHONE HYDROCHLORIDE 1 MG: 1 INJECTION, SOLUTION INTRAMUSCULAR; INTRAVENOUS; SUBCUTANEOUS at 08:50

## 2022-01-04 RX ADMIN — MEROPENEM AND SODIUM CHLORIDE 1000 MG: 1 INJECTION, SOLUTION INTRAVENOUS at 11:56

## 2022-01-04 RX ADMIN — SODIUM CHLORIDE, SODIUM LACTATE, POTASSIUM CHLORIDE, AND CALCIUM CHLORIDE: 600; 310; 30; 20 INJECTION, SOLUTION INTRAVENOUS at 15:27

## 2022-01-04 RX ADMIN — PROPOFOL 100 MG: 10 INJECTION, EMULSION INTRAVENOUS at 15:36

## 2022-01-04 RX ADMIN — FENTANYL CITRATE 100 MCG: 50 INJECTION INTRAMUSCULAR; INTRAVENOUS at 15:36

## 2022-01-04 RX ADMIN — MEROPENEM AND SODIUM CHLORIDE 1000 MG: 1 INJECTION, SOLUTION INTRAVENOUS at 00:22

## 2022-01-04 RX ADMIN — HYDROMORPHONE HYDROCHLORIDE 1 MG: 1 INJECTION, SOLUTION INTRAMUSCULAR; INTRAVENOUS; SUBCUTANEOUS at 00:22

## 2022-01-04 RX ADMIN — LIDOCAINE HYDROCHLORIDE 70 MG: 10 INJECTION, SOLUTION EPIDURAL; INFILTRATION; INTRACAUDAL; PERINEURAL at 15:36

## 2022-01-04 RX ADMIN — PROPOFOL 50 MG: 10 INJECTION, EMULSION INTRAVENOUS at 15:39

## 2022-01-04 RX ADMIN — PROPOFOL 140 MCG/KG/MIN: 10 INJECTION, EMULSION INTRAVENOUS at 15:36

## 2022-01-04 ASSESSMENT — PAIN SCALES - GENERAL
PAINLEVEL_OUTOF10: 7
PAINLEVEL_OUTOF10: 0
PAINLEVEL_OUTOF10: 9
PAINLEVEL_OUTOF10: 9

## 2022-01-04 ASSESSMENT — LIFESTYLE VARIABLES: SMOKING_STATUS: 1

## 2022-01-04 NOTE — PROGRESS NOTES
Comprehensive Nutrition Assessment    Type and Reason for Visit:  Reassess    Nutrition Assessment:  Pt is currently NPO. Will monitor nutrition progression and clinical course. Malnutrition Assessment:  Malnutrition Status: At risk for malnutrition (Comment)    Context:  Acute Illness     Findings of the 6 clinical characteristics of malnutrition:  Energy Intake:  7 - 50% or less of estimated energy requirements for 5 or more days  Weight Loss:  No significant weight loss     Body Fat Loss:  Unable to assess     Muscle Mass Loss:  Unable to assess    Fluid Accumulation:  No significant fluid accumulation     Strength:  Not Performed    Current Nutrition Therapies:    Diet NPO Exceptions are: Ice Chips, Sips of Water with Meds    Anthropometric Measures:  · Height: 5' 5\" (165.1 cm)  · Current Body Weight: 282 lb (127.9 kg)   · Ideal Body Weight: 125 lbs  · BMI: 46.9  · BMI Categories: Obese Class 3 (BMI 40.0 or greater)       Nutrition Diagnosis:   · Inadequate oral intake related to altered GI function as evidenced by NPO or clear liquid status due to medical condition    Nutrition Interventions:   Food and/or Nutrient Delivery:  Continue NPO  Coordination of Nutrition Care:  Continue to monitor while inpatient    Goals:  Pt will tolerate diet advance to meet nutritional needs.        Nutrition Monitoring and Evaluation:    Food/Nutrient Intake Outcomes:  Diet Advancement/Tolerance  Physical Signs/Symptoms Outcomes:  Biochemical Data,Nutrition Focused Physical Findings,Skin,Weight,GI Status     Electronically signed by Bri Allen, MS, RD, LD on 1/4/22 at 12:39 PM CST    Contact: [phone]

## 2022-01-04 NOTE — ANESTHESIA POSTPROCEDURE EVALUATION
Department of Anesthesiology  Postprocedure Note    Patient: Cinthia Colon  MRN: [id number]  YOB: 1980  Date of evaluation: 1/4/2022  Time:  3:56 PM     Procedure Summary     Date: 01/04/22 Room / Location: [address]    Anesthesia Start: 5252 Anesthesia Stop: 3461    Procedure: ENDOSCOPIC ULTRASOUND (N/A Abdomen) Diagnosis: (jaundice)    Surgeons: Robert Johnson MD Responsible Provider: DAVID Luu CRNA    Anesthesia Type: general ASA Status: 2          Anesthesia Type: general    Dedrick Phase I: Dedrick Score: 10    Dedrick Phase II:      Last vitals: Reviewed and per EMR flowsheets.        Anesthesia Post Evaluation    Patient location during evaluation: bedside  Patient participation: complete - patient participated  Level of consciousness: awake and alert  Pain score: 0  Airway patency: patent  Nausea & Vomiting: no vomiting  Complications: no  Cardiovascular status: hemodynamically stable  Respiratory status: nonlabored ventilation, spontaneous ventilation, room air and acceptable  Hydration status: euvolemic

## 2022-01-04 NOTE — ANESTHESIA PRE PROCEDURE
Department of Anesthesiology  Preprocedure Note       Name:  Ginette Hinds   Age:  42 y.o.  :  1980                                          MRN:  [id number]         Date:  2022      Surgeon: Willis Galloway):  Delvin Husain MD    Procedure: Procedure(s):  ENDOSCOPIC ULTRASOUND  ERCP DIAGNOSTIC    Medications prior to admission:   Prior to Admission medications    Medication Sig Start Date End Date Taking? Authorizing Provider   oxyCODONE-acetaminophen (PERCOCET)  MG per tablet Take 1 tablet by mouth every 6 hours as needed for Pain. Yes Historical Provider, MD   gabapentin (NEURONTIN) 300 MG capsule Take 300 mg by mouth every 8 hours.    Yes Historical Provider, MD   tiZANidine (ZANAFLEX) 4 MG tablet Take 4 mg by mouth every 8 hours as needed (spasms)   Yes Historical Provider, MD       Current medications:    Current Facility-Administered Medications   Medication Dose Route Frequency Provider Last Rate Last Admin    indomethacin (INDOCIN) 50 MG suppository 100 mg  100 mg Rectal Once Delvin Husain MD        hydrocortisone 1 % cream   Topical TID PRN Pema Hein MD        HYDROmorphone HCl PF (DILAUDID) injection 1 mg  1 mg IntraVENous Q4H PRN Pema Hein MD   1 mg at 22 0850    meropenem (MERREM) 1000 mg in sodium chloride 0.9% 50 mL (duplex)  1,000 mg IntraVENous Q12H Pema Hein MD   Stopped at 22 1312    diphenhydrAMINE (BENADRYL) tablet 25 mg  25 mg Oral Q6H PRN Pema Hein MD   25 mg at 21 0020    pantoprazole (PROTONIX) injection 40 mg  40 mg IntraVENous BID Pema Hein MD   40 mg at 22 0849    And    sodium chloride (PF) 0.9 % injection 10 mL  10 mL IntraVENous Daily Pema Hein MD   10 mL at 22 0718    0.9 % sodium chloride infusion   IntraVENous Continuous Pema Hein MD 75 mL/hr at 22 0800 New Bag at 22 0800    sodium chloride flush 0.9 % injection 5-40 mL  5-40 mL IntraVENous 2 times per day May Fall, MD   10 mL at 01/03/22 2005    sodium chloride flush 0.9 % injection 5-40 mL  5-40 mL IntraVENous PRN May Fall, MD        0.9 % sodium chloride infusion  25 mL IntraVENous PRN May Fall, MD        [Held by provider] enoxaparin (LOVENOX) injection 40 mg  40 mg SubCUTAneous Daily May Fall, MD        ondansetron (ZOFRAN-ODT) disintegrating tablet 4 mg  4 mg Oral Q8H PRN May Fall, MD        Or    ondansetron [facility]) injection 4 mg  4 mg IntraVENous Q6H PRN May Fall, MD   4 mg at 01/03/22 2005    polyethylene glycol (GLYCOLAX) packet 17 g  17 g Oral Daily PRN May Fall, MD           Allergies:  No Known Allergies    Problem List:    Patient Active Problem List   Diagnosis Code    Jaundice R17    Adnexal mass N94.89       Past Medical History:  No past medical history on file. Past Surgical History:  History reviewed. No pertinent surgical history.     Social History:    Social History     Tobacco Use    Smoking status: Current Every Day Smoker     Packs/day: 1.00     Types: Cigarettes     Start date: 1997    Smokeless tobacco: Never Used   Substance Use Topics    Alcohol use: Not Currently                                Ready to quit: Yes  Counseling given: No      Vital Signs (Current):   Vitals:    01/03/22 1701 01/03/22 1920 01/04/22 0308 01/04/22 0803   BP: (!) 135/99 (!) 165/75 (!) 113/58 (!) 148/83   Pulse: 57 62 55 64   Resp: 18 16 16 18   Temp: 97.7 °F (36.5 °C) 96.8 °F (36 °C) 96.8 °F (36 °C) 97.2 °F (36.2 °C)   TempSrc:  Temporal Temporal Temporal   SpO2: 100% 99% 99% 98%   Weight:       Height:                                                  BP Readings from Last 3 Encounters:   01/04/22 (!) 148/83       NPO Status: Time of last liquid consumption: 2200                        Time of last solid consumption: 2200                        Date of last liquid consumption: 01/03/22                        Date of last solid food consumption: 01/03/22    BMI:   Wt Readings from Last 3 Encounters:   12/27/21 282 lb (127.9 kg)     Body mass index is 46.93 kg/m². CBC:   Lab Results   Component Value Date    WBC 7.9 01/04/2022    RBC 4.09 01/04/2022    HGB 11.7 01/04/2022    HCT 36.7 01/04/2022    MCV 89.7 01/04/2022    RDW 15.8 01/04/2022     01/04/2022       CMP:   Lab Results   Component Value Date     01/04/2022    K 4.0 01/04/2022     01/04/2022    CO2 26 01/04/2022    BUN 8 01/04/2022    CREATININE 0.5 01/04/2022    GFRAA >59 01/04/2022    LABGLOM >60 01/04/2022    GLUCOSE 113 01/04/2022    PROT 5.3 01/04/2022    CALCIUM 8.7 01/04/2022    BILITOT 2.5 01/04/2022    ALKPHOS 233 01/04/2022     01/04/2022     01/04/2022       POC Tests: No results for input(s): POCGLU, POCNA, POCK, POCCL, POCBUN, POCHEMO, POCHCT in the last 72 hours.     Coags:   Lab Results   Component Value Date    PROTIME 13.4 01/01/2022    INR 1.00 01/01/2022    APTT 27.4 12/27/2021       HCG (If Applicable): No results found for: PREGTESTUR, PREGSERUM, HCG, HCGQUANT     ABGs: No results found for: PHART, PO2ART, TEH5CLN, VMK9RFQ, BEART, [id number]     Type & Screen (If Applicable):  No results found for: LABABO, LABRH    Drug/Infectious Status (If Applicable):  No results found for: HIV, HEPCAB    COVID-19 Screening (If Applicable):   Lab Results   Component Value Date    COVID19 Not Detected 12/27/2021           Anesthesia Evaluation  Patient summary reviewed and Nursing notes reviewed no history of anesthetic complications:   Airway: Mallampati: II  TM distance: >3 FB   Neck ROM: full  Mouth opening: > = 3 FB Dental:          Pulmonary:   (+) current smoker                           Cardiovascular:Negative CV ROS             Beta Blocker:  Not on Beta Blocker         Neuro/Psych:      (-) seizures and CVA           GI/Hepatic/Renal: Neg GI/Hepatic/Renal ROS Endo/Other: Negative Endo/Other ROS                    Abdominal:             Vascular: negative vascular ROS. Other Findings:             Anesthesia Plan      general     ASA 2       Induction: intravenous. MIPS: Postoperative opioids intended and Prophylactic antiemetics administered. Anesthetic plan and risks discussed with patient.                       Beth Mello MD   1/4/2022

## 2022-01-04 NOTE — OP NOTE
Referring/Primary Care Provider: Jessika Fried MD,     Referring:   Bull Jerez MD    Date of Procedure: 01/04/22    Procedure:   1. EGD with Endoscopic Ultrasound     Indications:   1. Elevated LFTs, nausea, abdominal discomfort. Normal appearing MRCP, U/S and CT scan. HIDA scan with no obvious distal obstruction    Anesthesia:  Sedation was administered by anesthesia who monitored the patient during the procedure. Procedure:   After reviewing the patient's chart, H&P, medications, obtaining informed consent, and discussing risks benefits and alternatives to the procedure the patient was placed in the left lateral decubitus position. A oblique viewing Olympus 140 Linear EUS scope was lubricated and inserted through the mouth into the oropharynx. Under indirect visualization, the upper esophagus was intubated. The scope was advanced to the level of the third portion of duodenum with limited views of the esophageal mucosa. Findings and maneuvers are listed in impression below. The patient tolerated the procedure well. There were no immediate complications. Findings:   Endoscopic Finding:   - endoscopic evaluation of the upper GI tract appeared normal.     Endosonographic Findings:  - The celiac axis and associated vascular structures was identified and examined. No concerning or malignant lymphadenopathy was identified.     - Limited views of the left lobe of the liver revealed no biliary dilation or focal hepatic mass. - The EUS scope was advanced to the duodenal bulb. The CBD did not appear dilated. No filling defect or stone was seen. - Pancreas: normal in appearance. Estimated Blood Loss: minimal    IMPRESSION:  1. No obvious evidence of biliary dilation or bile duct filling defect. 2. Elevated LFTs - ?? Due to intrinsic liver disease vs DILI of unclear etiology.      RECOMMENDATIONS:   - Continue to trend LFTs  - Our office will plan to see patient in 2-3 weeks with repeat LFTs and discuss further workup vs liver biopsy if indicated. - Okay for discharge from my standpoint. The results were discussed with the patient and family. A copy of the images obtained were given to the patient.      Ruddy Márquez MD  01/04/22  3:54 PM

## 2022-01-04 NOTE — PLAN OF CARE
Nutrition Problem #1: Inadequate oral intake  Intervention: Food and/or Nutrient Delivery: Continue NPO  Nutritional Goals: Pt will tolerate diet advance to meet nutritional needs.

## 2022-01-04 NOTE — DISCHARGE SUMMARY
[facility], [place], [address]    DEPARTMENT OF HOSPITALIST MEDICINE      DISCHARGE SUMMARY:      PATIENT NAME:  Ginette Hinds  :  1980  MRN:  [id number]    Admission Date:   2021  5:19 PM Attending: Pamela Comer MD   Discharge Date:   2022              PCP: Salena Loera MD  Length of Stay: 8 days     Chief Complaint on Admission:   Chief Complaint   Patient presents with    Abdominal Pain    Jaundice       Consultants:     IP CONSULT TO GI  IP CONSULT TO OB GYN  IP CONSULT TO GI       Discharge Problem List:   Principal Problem:    Obstructive jaundice  Active Problems:    Adnexal mass    Tobacco abuse counseling    Dependence on nicotine from cigarettes    Morbid obesity ([address]    Elevated LFTs  Resolved Problems:    * No resolved hospital problems. *      Dependence on nicotine from cigarettes          Tobacco abuse counseling  Patient smokes cigarettes on a chronic basis. Strictly advised patient to cut down on or quit smoking. Nicotine patch offered. ~5 minutes spent on tobacco cessation counseling with the patient. Websites - http://smokefree. gov & LegalWarrants.Marketo. com   °Quitlines - 1-800-QUIT-NOW (8-664-709-858-074-2820)   °[employer] Apps - QuitSTART [place]   °Text Messages - SmokefreeTXT (send the word QUIT to 22720)        Last dated Assessment and Plan . .. 1/3/2022: Active Problems:    Jaundice    Adnexal mass  Resolved Problems:    * No resolved hospital problems.  *     Active Problems:    Jaundice              -Trend labs-liver synthetic fxn appears intact              -INR  1.0                                   -Autoimmune hepatitis labs appear nl              -Acute hepatitis panel negative              -Await transfer to [facility]              -Gi following                -abnormal HIDA scan              -Dr. Gene Cardona available for ERCP next week              -follow labs               -Patient bilirubin level is slowly downtrending              -Monitor LFTs closely -Patient tolerating full liquid diet               -Keep her n.p.o. in a.m. for possible ERCP by Dr. Denver Quivers              -Cancel transfer to [facility] as Dr. Denver Quivers, our GI endoscopist, will be available tomorrow                         Adnexal mass              -OB/GYN to follow-outpatient            Resolved Problems:    * No resolved hospital problems. *        Antibiotic: Meropenem     DVT Prophylaxis:SCD's     GI prophylaxis: Protonix       CUMULATIVE  HOSPITAL  COURSE  AND  TREATMENT:    43 yo female presented to [facility] ED on 12/27/2021 for evaluation of yellow skin/jaundice that she had reported beginning day of admission. She had reported  One week history of nonbloody/nonbilious emesis associated with epigastric abdominal pain and lethargy. She had denied any previous history of liver problems. She denied history of heavy ETOH use reporting that she drank occasional mixed drink. She reported history of \"fatty liver\" and had cholecystectomy in June due to cholelithiasis.      Gastroenterology was consulted and evaluated patient 12/28. GI consultant requested possible transfer to facility with ERCP for questionable obstruction, however, [facility] in [place] both are full. She has been put on the low acuity list at [facility] on 12/29/2021. MRCP was negative, US of liver performed. Gynecology consulted however will follow patient as outpatient. 12/30 NP hepatobiliary test with concern for partial distal common bile duct obstruction. Transfer center was called in attempt to transfer patient to [facility] for ERCP by Dr. Tenisha Lilly. Transfer center related that she was accepted at [facility] when a bed is available however they would contact [facility] for possible transfer there. Transfer center and [facility] contacted daily for the last week without any bed availability or GI coverage. Her LFTs and total bilirubin level is slowly trending down.  Her total bilirubin is 2.5 today (direct bilirubin 1.5, indirect bilirubin 1). Our GI endoscopist restarted today hence the transfer request was canceled. Dr. Esther Matrinez took the patient for EGD with endoscopic ultrasound and cleared her for discharge. His findings and recommendations from today are as below: PCP/GI requested to follow-up closely on her LFTs and bilirubin level as an outpatient. Patient has completed 8 days course of IV antibiotics in the form of Merrem. She is afebrile without any leukocytosis. GI do not recommend any discharge antibiotics. Patient advised to follow-up with OB/GYN as an outpatient regarding her adnexal mass. Date of Procedure: 01/04/22     Procedure:   1. EGD with Endoscopic Ultrasound      Indications:   1. Elevated LFTs, nausea, abdominal discomfort. Normal appearing MRCP, U/S and CT scan. HIDA scan with no obvious distal obstruction      OBJECTIVE:  BP (!) 165/75   Pulse 64   Temp 96.3 °F (35.7 °C) (Temporal)   Resp 18   Ht 5' 5\" (1.651 m)   Wt 282 lb (127.9 kg)   SpO2 99%   BMI 46.93 kg/m²       Heart: RRR   Lungs: Bilateral decreased air entry   Abdomen: Soft, + minimal right upper quadrant tenderness on palpation   Extremities: No edema   Neurologic: Alert and oriented   Skin: Warm and dry, + mildly icteric          Laboratory Data:  Recent Labs     01/02/22  0845 01/03/22  0508 01/04/22  0255   WBC 7.5 7.6 7.9   HGB 12.5 11.5* 11.7*    109* 112*     Recent Labs     01/02/22  0845 01/03/22  0508 01/04/22  0255    141 139   K 3.9 3.7 4.0    105 103   CO2 26 25 26   BUN 4* 4* 8   CREATININE 0.5 0.5 0.5   GLUCOSE 106 100 113*     Recent Labs     01/02/22  0845 01/03/22  0508 01/04/22  0255   * 121* 108*   * 176* 161*   BILITOT 5.4* 3.2* 2.5*   ALKPHOS 283* 241* 233*     Troponin T: No results for input(s): TROPONINI in the last 72 hours. Pro-BNP: No results for input(s): BNP in the last 72 hours. INR: No results for input(s): INR in the last 72 hours.   UA:No results for input(s): NITRITE, COLORU, PHUR, LABCAST, WBCUA, RBCUA, MUCUS, TRICHOMONAS, YEAST, BACTERIA, CLARITYU, SPECGRAV, LEUKOCYTESUR, UROBILINOGEN, BILIRUBINUR, BLOODU, GLUCOSEU, AMORPHOUS in the last 72 hours. Invalid input(s): Gerardine David  A1C: No results for input(s): LABA1C in the last 72 hours. ABG:No results for input(s): PHART, WAR6OJS, PO2ART, SIP3MMF, BEART, HGBAE, F9TREBYR, CARBOXHGBART in the last 72 hours. Impressions of imaging performed in 48 hours before discharge:    No results found. Medication List      CONTINUE taking these medications    gabapentin 300 MG capsule  Commonly known as: NEURONTIN     oxyCODONE-acetaminophen  MG per tablet  Commonly known as: PERCOCET     tiZANidine 4 MG tablet  Commonly known as: ZANAFLEX              ISSUES TO BE ADDRESSED AT HOSPITAL FOLLOW-UP VISIT:    Advised patient to follow-up closely with PCP upon discharge for management of chronic medical issues  Please see the detailed discharge directions delivered from earlier today! Condition on Discharge: gradually improving  Discharge Disposition: Home    Recommended Follow Up:  No follow-up provider specified. Followup Appointments Scheduled at Time of Discharge:  No future appointments. Discharge Instructions:   Please see the discharge paperwork. Patient was seen at bedside today, and the examination shows improvement since yesterday. Detailed discharge directions delivered to the patient by myself and our nursing staff, who verbalizes understanding and is very happy and satisfied with the plan. Patient has been advised to continue all medications as prescribed and advised, and f/u with PCP within 1 week. Patient is stable from medical standpoint to be discharged. Total time spent during patient evaluation and assessment, discussion with the nurse/family, addressing discharge medications/scripts and coordination of care for safe discharge was in excess of 35 minutes.       Signed Electronically:    Skye Doyle MD  5:09 PM 1/4/2022

## 2022-01-04 NOTE — PROGRESS NOTES
[facility] Hospitalists      Patient:  Tamra Delaney  YOB: 1980  Date of Service: 1/3/2022  MRN: [id number]   Acct: [de-identified]   Primary Care Physician: Lennox Byars, MD  Advance Directive: Full Code  Admit Date: 12/27/2021       Hospital Day: 7  Portions of this note have been copied forward, however, changed to reflect the most current clinical status of this patient. CHIEF COMPLAINT Jaundice    SUBJECTIVE: Patient continues to feel better. Her bilirubin is now down to 3.2. She is tolerating her diet    CUMULATIVE HOSPITAL COURSE: 43 yo female presented to [facility] ED on 12/27/2021 for evaluation of yellow skin/jaundice that she had reported beginning day of admission. She had reported  One week history of nonbloody/nonbilious emesis associated with epigastric abdominal pain and lethargy. She had denied any previous history of liver problems. She denied history of heavy ETOH use reporting that she drank occasional mixed drink. She reported history of \"fatty liver\" and had cholecystectomy in June due to cholelithiasis. Gastroenterology was consulted and evaluated patient 12/28. GI consultant requested possible transfer to facility with ERCP for questionable obstruction, however, [facility] in [place] both are full. She has been put on the low acuity list at [facility] on 12/29/2021. MRCP was negative, US of liver performed. Gynecology consulted however will follow patient as outpatient. 12/30 NP hepatobiliary test with concern for partial distal common bile duct obstruction. Transfer center was called in attempt to transfer patient to [facility] for ERCP by Dr. Preston Lomeli. Transfer center related that she was accepted at [facility] when a bed is available however they would contact [facility] for possible transfer there. On 12/30 and 12/31, transfer center contacted [facility] however no GI coverage. Pt awaiting bed available at [facility].      Review of Systems:   Review of Systems Constitutional: Negative for fever. Gastrointestinal: Positive for abdominal pain and nausea. Negative for vomiting. All other systems reviewed and are negative. 14 point review of systems is negative except as specifically addressed above. Objective:   VITALS:  BP (!) 165/75   Pulse 62   Temp 96.8 °F (36 °C) (Temporal)   Resp 16   Ht 5' 5\" (1.651 m)   Wt 282 lb (127.9 kg)   SpO2 99%   BMI 46.93 kg/m²   24HR INTAKE/OUTPUT:      Intake/Output Summary (Last 24 hours) at 1/3/2022 1921  Last data filed at 1/3/2022 1855  Gross per 24 hour   Intake 1426.87 ml   Output    Net 1426.87 ml       Physical Exam  Vitals reviewed. Constitutional:       Comments: 42 yr old female, awake, alert and in no apparent distress   HENT:      Head: Normocephalic. Right Ear: External ear normal.      Left Ear: External ear normal.   Eyes:      General: Scleral icterus present. Conjunctiva/sclera: Conjunctivae normal.      Pupils: Pupils are equal, round, and reactive to light. Cardiovascular:      Rate and Rhythm: Normal rate and regular rhythm. Heart sounds: Normal heart sounds. Pulmonary:      Effort: Pulmonary effort is normal.      Breath sounds: Normal breath sounds. Abdominal:      General: Bowel sounds are normal.      Palpations: Abdomen is soft. Tenderness: There is no abdominal tenderness. Musculoskeletal:         General: Normal range of motion. Cervical back: Normal range of motion. Skin:     General: Skin is warm and dry. Coloration: Skin is jaundiced. Neurological:      Mental Status: She is alert and oriented to person, place, and time.              Medications:      sodium chloride 75 mL/hr at 01/03/22 1805    sodium chloride        meropenem  1,000 mg IntraVENous Q12H    pantoprazole  40 mg IntraVENous BID    And    sodium chloride (PF)  10 mL IntraVENous Daily    sodium chloride flush  5-40 mL IntraVENous 2 times per day    [Held by provider] enoxaparin  40 mg SubCUTAneous Daily     hydrocortisone, HYDROmorphone, diphenhydrAMINE, sodium chloride flush, sodium chloride, ondansetron **OR** ondansetron, polyethylene glycol  ADULT DIET; Full Liquid  ADULT ORAL NUTRITION SUPPLEMENT; Dinner; Standard High Calorie/High Protein Oral Supplement  Diet NPO Exceptions are: Ice Chips, Sips of Water with Meds     Lab and other Data:     Recent Labs     01/01/22 0436 01/02/22  0845 01/03/22  0508   WBC 6.7 7.5 7.6   HGB 11.9* 12.5 11.5*   * 132 109*     Recent Labs     01/01/22 0436 01/02/22  0845 01/03/22  0508    139 141   K 4.0 3.9 3.7    102 105   CO2 26 26 25   BUN 4* 4* 4*   CREATININE 0.5 0.5 0.5   GLUCOSE 97 106 100     Recent Labs     01/01/22 0436 01/02/22  0845 01/03/22  0508   * 146* 121*   * 209* 176*   BILITOT 6.0* 5.4* 3.2*   ALKPHOS 252* 283* 241*     Troponin T: No results for input(s): TROPONINI in the last 72 hours. Pro-BNP: No results for input(s): BNP in the last 72 hours. INR:   Recent Labs     01/01/22 0436   INR 1.00       RAD:   NM HEPATOBILIARY    Result Date: 12/28/2021  1. Very minimal radiopharmaceutical in the proximal small bowel and nonvisualization of the biliary system may represent hepatobiliary dysfunction or at least partial, significant, distal common bile duct obstruction? . Signed by Dr Mota Lucio Additional Contrast? None    Result Date: 12/27/2021  1. Mild hepatic steatosis without focal liver lesions. The gallbladder surgically absent. No biliary ductal dilatation. 2. Mild splenomegaly. 3. Endometrium appears mildly thickened and irregular, correlate with patient presentation. 4. 2.8 cm fat-containing left adnexal mass, ovarian dermoid considered. GYN consultation recommended. 5. Otherwise, No CT evidence of acute intra-abdominal/pelvic pathological process.  Signed by Dr Nory Raymond LIVER    Result Date: 12/28/2021  Unremarkable hepatic ultrasound. No biliary ductal dilatation. Evidence of previous cholecystectomy. Signed by Dr Jerod Mckeon. Thanh    MRI ABDOMEN WO CONTRAST MRCP    Result Date: 12/28/2021  No acute abnormality, no intrahepatic or extrahepatic biliary ductal dilatation and no obvious choledocholithiasis. There is previous cholecystectomy. Normal appearance of the liver. A preliminary report was provided by the [facility] radiology service. There is no significant discrepancy with the preliminary report. Signed by Dr Jerod Mckeon. Thanh       Assessment/Plan   Active Problems:    Jaundice    Adnexal mass  Resolved Problems:    * No resolved hospital problems. *    Active Problems:    Jaundice              -Trend labs-liver synthetic fxn appears intact   -INR  1.0                      -Autoimmune hepatitis labs appear nl   -Acute hepatitis panel negative              -Await transfer to [facility]   -Gi following     -abnormal HIDA scan   -Dr. Vishal Lobato available for ERCP next week              -follow labs    -Patient bilirubin level is slowly downtrending   -Monitor LFTs closely   -Patient tolerating full liquid diet    -Keep her n.p.o. in a.m. for possible ERCP by Dr. Vishal Lobato   -Cancel transfer to [facility] as Dr. Vishal Lobato, our GI endoscopist, will be available tomorrow                         Adnexal mass              -OB/GYN to follow-outpatient            Resolved Problems:    * No resolved hospital problems.  *        Antibiotic: Meropenem     DVT Prophylaxis:SCD's     GI prophylaxis: Protonix     Disposition: TBD        Juan Ramos MD, 1/3/2022 7:21 PM

## 2022-01-05 ENCOUNTER — CARE COORDINATION (OUTPATIENT)
Dept: CASE MANAGEMENT | Age: 42
End: 2022-01-05

## 2022-01-05 DIAGNOSIS — R79.89 ELEVATED LFTS: Primary | ICD-10-CM

## 2022-01-05 NOTE — CARE COORDINATION
[facility] Transitions Initial Follow Up Call    Call within 2 business days of discharge: Yes    Patient: Ginette Hinds Patient : 1980   MRN: [id number]  Reason for Admission: Jaundice, Elevated LFTs, et al  Discharge Date: 22 RARS: Readmission Risk Score: 9 ( )      Last Discharge [facility]       Complaint Diagnosis Description Type Department Provider    21 Abdominal Pain; Jaundice Jaundice, non- ED to Hosp-Admission (Discharged) (ADMITTED) L 5 Ailyn Regan MD; Kathy Stevens,... Spoke with: N/A    Facility: [address] [place] [address]    Non-face-to-face services provided:  Reviewed encounter information for continuity of care prior to follow up Care Transitions phone call - chart notes, consults, progress notes, test results, med list, appointments, AVS, other information. Care Transitions 24 Hour Call    Care Transitions Interventions       Attempted to make contact with patient/caregiver for an initial transitions of care follow up call post discharge without success. Unable to leave a message regarding intent of call and call back information. Call was forwarded to an unidentifiable voice messaging system (mobile voice mail or telephone answering machine). CTN will follow up again at a later time. Any previously scheduled hospital follow up appointments noted below. Follow Up  No future appointments.     Cheryl Delaney RN

## 2022-01-05 NOTE — DISCHARGE INSTR - DIET
Good nutrition is important when healing from an illness, injury, or surgery. Follow any nutrition recommendations given to you during your hospital stay. If you were given an oral nutrition supplement while in the hospital, continue to take this supplement at home. You can take it with meals, in-between meals, and/or before bedtime. These supplements can be purchased at most local grocery stores, pharmacies, and chain [employer]-stores. If you have any questions about your diet or nutrition, call the hospital and ask for the dietitian.       ADVANCE DIET AS TOLERATED

## 2022-01-06 ENCOUNTER — CARE COORDINATION (OUTPATIENT)
Dept: CASE MANAGEMENT | Age: 42
End: 2022-01-06

## 2022-01-06 ENCOUNTER — TELEPHONE (OUTPATIENT)
Dept: GASTROENTEROLOGY | Age: 42
End: 2022-01-06

## 2022-01-06 NOTE — TELEPHONE ENCOUNTER
Called patient and talked to the patient. She is doing well. He denies fever, chills, abdominal pain. She told me she is back to work. She has a follow-up appointment scheduled later on this month. I advised her to keep her follow-up appointment with GONZALO Obrien. I saw she had some blood test ordered yesterday. Patient denies taking any complementary medications, supplemental medication, natural medication, herbal medications, healthy foods medications. Her EUS was negative for CBD stones. Is not clear about her jaundice and the cause of it. She may have a less common causes such as CMV hepatitis, other hepatitides. However she is not better with the antibiotics. Her LFTs got better 2. Patient going to call Dr. Fabricio Costello office to confirm the appointment. She will have follow-up LFTs and if they are still abnormal she would need additional blood testing and if LFTs do not improve she may need liver biopsy. It should be mentioned she had liver biopsy in June 2021 and she had mild steatosis.

## 2022-01-06 NOTE — CARE COORDINATION
[facility] Transitions Initial Follow Up Call    Call within 2 business days of discharge: Yes    Patient: Glenroy Lewis Patient : 1980   MRN: [id number]  Reason for Admission: Jaundice, elevated LFTs  Discharge Date: 22 RARS: Readmission Risk Score: 9 ( )      Last Discharge [facility]       Complaint Diagnosis Description Type Department Provider    21 Abdominal Pain; Jaundice Jaundice, non- ED to Hosp-Admission (Discharged) (ADMITTED) [facility] 5 Tonie Booker MD; Bonita Sanon,... Spoke with: N/A    Facility: [address] [place] [address]    Non-face-to-face services provided:  Reviewed encounter information for continuity of care prior to follow up Care Transitions phone call - chart notes, consults, progress notes, test results, med list, appointments, AVS, other information. Care Transitions 24 Hour Call    Care Transitions Interventions       Attempted to make contact with patient/caregiver for an initial transitions of care follow up call post discharge without success. Unable to leave a message regarding intent of call and call back information. Call was forwarded to an unidentifiable voice messaging system (mobile voice mail or telephone answering machine). As this repeated attempt to make contact was unsuccessful, will disengage at this time. Any previously scheduled hospital follow up appointments noted below. Follow Up  No future appointments.     Alice Baldwin RN

## 2022-01-18 ENCOUNTER — OFFICE VISIT (OUTPATIENT)
Dept: GASTROENTEROLOGY | Age: 42
End: 2022-01-18
Payer: COMMERCIAL

## 2022-01-18 VITALS
BODY MASS INDEX: 41.65 KG/M2 | SYSTOLIC BLOOD PRESSURE: 138 MMHG | HEART RATE: 87 BPM | HEIGHT: 65 IN | OXYGEN SATURATION: 99 % | DIASTOLIC BLOOD PRESSURE: 80 MMHG | WEIGHT: 250 LBS

## 2022-01-18 DIAGNOSIS — R79.89 ELEVATED LFTS: ICD-10-CM

## 2022-01-18 DIAGNOSIS — K71.9 DRUG-INDUCED LIVER INJURY: Primary | ICD-10-CM

## 2022-01-18 LAB
ALBUMIN SERPL-MCNC: 4.2 G/DL (ref 3.5–5.2)
ALP BLD-CCNC: 85 U/L (ref 35–104)
ALT SERPL-CCNC: 33 U/L (ref 5–33)
ANION GAP SERPL CALCULATED.3IONS-SCNC: 12 MMOL/L (ref 7–19)
AST SERPL-CCNC: 32 U/L (ref 5–32)
BILIRUB SERPL-MCNC: 1 MG/DL (ref 0.2–1.2)
BUN BLDV-MCNC: 10 MG/DL (ref 6–20)
CALCIUM SERPL-MCNC: 9.2 MG/DL (ref 8.6–10)
CHLORIDE BLD-SCNC: 100 MMOL/L (ref 98–111)
CO2: 26 MMOL/L (ref 22–29)
CREAT SERPL-MCNC: 0.6 MG/DL (ref 0.5–0.9)
GFR AFRICAN AMERICAN: >59
GFR NON-AFRICAN AMERICAN: >60
GLUCOSE BLD-MCNC: 91 MG/DL (ref 74–109)
POTASSIUM SERPL-SCNC: 3.6 MMOL/L (ref 3.5–5)
SODIUM BLD-SCNC: 138 MMOL/L (ref 136–145)
TOTAL PROTEIN: 6.9 G/DL (ref 6.6–8.7)

## 2022-01-18 PROCEDURE — 99214 OFFICE O/P EST MOD 30 MIN: CPT | Performed by: NURSE PRACTITIONER

## 2022-01-18 ASSESSMENT — ENCOUNTER SYMPTOMS
DIARRHEA: 0
CONSTIPATION: 0
NAUSEA: 0
TROUBLE SWALLOWING: 0
ABDOMINAL PAIN: 0
SHORTNESS OF BREATH: 0
CHOKING: 0
COUGH: 0
VOMITING: 0
ANAL BLEEDING: 0
ABDOMINAL DISTENTION: 0
RECTAL PAIN: 0
BLOOD IN STOOL: 0

## 2022-01-18 NOTE — PROGRESS NOTES
Subjective:     Patient ID: Keisha Canchola is a 42 y.o. female  PCP: Alisson Hernandez MD  Referring Provider: No ref. provider found    HPI  Patient presents to the office today with the following complaints: Follow-up      Pt seen today for follow up after recent hospitalization for jaundice. EUS was performed and essentially normal.  She presented to ER on 12/27/2021 with c/o jaundice, abdominal pain, vomiting. Alk Phos 226, , , and Bilirubin 5.9 on arrival.  Bilirubin increased to 8.7 during admission. Liver biopsy last year during cholecystectomy for gallstones, noted mild steatosis. Pt was discharged with plan to trend LFTs and consider repeat liver biopsy. Today, her LFTs are completely normal.  She admits to using Kratom for one week prior to this - end of November/beginning December for anxiety. Per Fany Jeff has likelihood score B (likely cause of clinically apparent liver injury). Today, pt denies any further jaundice, vomiting, or abdominal pain. She continues to report fatigue and trying to get her energy back. She states while in hospital, she never even consider Kratom to be a source of the issue. Lab Results   Component Value Date     01/18/2022    K 3.6 01/18/2022     01/18/2022    CO2 26 01/18/2022    BUN 10 01/18/2022    CREATININE 0.6 01/18/2022    GLUCOSE 91 01/18/2022    CALCIUM 9.2 01/18/2022    PROT 6.9 01/18/2022    LABALBU 4.2 01/18/2022    BILITOT 1.0 01/18/2022    ALKPHOS 85 01/18/2022    AST 32 01/18/2022    ALT 33 01/18/2022    LABGLOM >60 01/18/2022    GFRAA >59 01/18/2022         EGD/EUS Findings 1/4/2021:   Endoscopic Finding:   - endoscopic evaluation of the upper GI tract appeared normal.   Endosonographic Findings:  - The celiac axis and associated vascular structures was identified and examined.   No concerning or malignant lymphadenopathy was identified.   - Limited views of the left lobe of the liver revealed no biliary dilation or focal hepatic mass. - The EUS scope was advanced to the duodenal bulb. The CBD did not appear dilated. No filling defect or stone was seen. - Pancreas: normal in appearance. Estimated Blood Loss: minimal  IMPRESSION:  1. No obvious evidence of biliary dilation or bile duct filling defect. 2. Elevated LFTs - ?? Due to intrinsic liver disease vs DILI of unclear etiology. RECOMMENDATIONS:   - Continue to trend LFTs  - Our office will plan to see patient in 2-3 weeks with repeat LFTs and discuss further workup vs liver biopsy if indicated. - Okay for discharge from my standpoint. Assessment:     1. Drug-induced liver injury            Plan:   - DILI likely related to use of Kratom. Pt to avoid this and other OTC herbal supplements prior to speaking with healthcare provider  - Call with any questions or concerns  - Follow up prn or sooner if needed    Orders  No orders of the defined types were placed in this encounter. Medications  No orders of the defined types were placed in this encounter. Patient History:     History reviewed. No pertinent past medical history.     Past Surgical History:   Procedure Laterality Date    LIVER BIOPSY  06/2021    mild steatosis    UPPER GASTROINTESTINAL ENDOSCOPY N/A 01/04/2022    Dr Silas Daily-w/EUS-No obvious evidence of biliary dilation or bile duct filling defect       Family History   Problem Relation Age of Onset    Colon Cancer Neg Hx     Colon Polyps Neg Hx        Social History     Socioeconomic History    Marital status:      Spouse name: None    Number of children: None    Years of education: None    Highest education level: None   Occupational History    None   Tobacco Use    Smoking status: Current Every Day Smoker     Packs/day: 1.00     Types: Cigarettes     Start date: 1997    Smokeless tobacco: Never Used   Substance and Sexual Activity    Alcohol use: Not Currently    Drug use: Not Currently    Sexual activity: None   Other Topics Concern    None   Social History Narrative    None     Social Determinants of Health     Financial Resource Strain:     Difficulty of Paying Living Expenses: Not on file   Food Insecurity:     Worried About Running Out of Food in the Last Year: Not on file    Jane of Food in the Last Year: Not on file   Transportation Needs:     Lack of Transportation (Medical): Not on file    Lack of Transportation (Non-Medical): Not on file   Physical Activity:     Days of Exercise per Week: Not on file    Minutes of Exercise per Session: Not on file   Stress:     Feeling of Stress : Not on file   Social Connections:     Frequency of Communication with Friends and Family: Not on file    Frequency of Social Gatherings with Friends and Family: Not on file    Attends Zoroastrianism Services: Not on file    Active Member of [address] [place] [address] [employer] or Organizations: Not on file    Attends Club or Organization Meetings: Not on file    Marital Status: Not on file   Intimate Partner Violence:     Fear of Current or Ex-Partner: Not on file    Emotionally Abused: Not on file    Physically Abused: Not on file    Sexually Abused: Not on file   Housing Stability:     Unable to Pay for Housing in the Last Year: Not on file    Number of [place] in the Last Year: Not on file    Unstable Housing in the Last Year: Not on file       Current Outpatient Medications   Medication Sig Dispense Refill    oxyCODONE-acetaminophen (PERCOCET)  MG per tablet Take 1 tablet by mouth every 6 hours as needed for Pain.  gabapentin (NEURONTIN) 300 MG capsule Take 300 mg by mouth every 8 hours.  tiZANidine (ZANAFLEX) 4 MG tablet Take 4 mg by mouth every 8 hours as needed (spasms)       No current facility-administered medications for this visit. No Known Allergies    Review of Systems   Constitutional: Positive for fatigue. Negative for activity change, appetite change, fever and unexpected weight change.    HENT: Negative for trouble swallowing. Respiratory: Negative for cough, choking and shortness of breath. Cardiovascular: Negative for chest pain. Gastrointestinal: Negative for abdominal distention, abdominal pain, anal bleeding, blood in stool, constipation, diarrhea, nausea, rectal pain and vomiting. Allergic/Immunologic: Negative for food allergies. All other systems reviewed and are negative. Objective:     /80 (Site: Left Upper Arm)   Pulse 87   Ht 5' 5\" (1.651 m)   Wt 250 lb (113.4 kg)   SpO2 99%   BMI 41.60 kg/m²     Physical Exam  Vitals reviewed. Constitutional:       General: She is not in acute distress. Appearance: She is well-developed. HENT:      Head: Normocephalic and atraumatic. Right Ear: External ear normal.      Left Ear: External ear normal.      Nose: Nose normal.      Comments: Mask on     Mouth/Throat:      Comments: Mask on  Eyes:      General: No scleral icterus. Right eye: No discharge. Left eye: No discharge. Conjunctiva/sclera: Conjunctivae normal.      Pupils: Pupils are equal, round, and reactive to light. Cardiovascular:      Rate and Rhythm: Normal rate and regular rhythm. Heart sounds: Normal heart sounds. No murmur heard. Pulmonary:      Effort: Pulmonary effort is normal. No respiratory distress. Breath sounds: Normal breath sounds. No wheezing or rales. Abdominal:      General: Bowel sounds are normal. There is no distension. Palpations: Abdomen is soft. There is no mass. Tenderness: There is no abdominal tenderness. There is no guarding or rebound. Musculoskeletal:         General: Normal range of motion. Cervical back: Normal range of motion and neck supple. Skin:     General: Skin is warm and dry. Coloration: Skin is not pale. Neurological:      Mental Status: She is alert and oriented to person, place, and time.    Psychiatric:         Behavior: Behavior normal.

## 2022-03-04 ENCOUNTER — TELEPHONE (OUTPATIENT)
Dept: OBGYN CLINIC | Age: 42
End: 2022-03-04

## 2022-03-04 NOTE — TELEPHONE ENCOUNTER
Luciana Feliz requests that office  return their call. The best time to reach her is Anytime. Patient was in seen in hospital by one of the providers but she not sure who she seen in the hospital , Patient was told to call and make a follow up appointment, Federico Oliveira was not able to find any notes for follow up appointment, Please call patient [phone]    Thank you.

## 2022-03-14 ENCOUNTER — OFFICE VISIT (OUTPATIENT)
Dept: OBGYN CLINIC | Age: 42
End: 2022-03-14
Payer: COMMERCIAL

## 2022-03-14 VITALS
BODY MASS INDEX: 40.15 KG/M2 | SYSTOLIC BLOOD PRESSURE: 138 MMHG | DIASTOLIC BLOOD PRESSURE: 84 MMHG | HEIGHT: 65 IN | WEIGHT: 241 LBS

## 2022-03-14 DIAGNOSIS — N83.202 LEFT OVARIAN CYST: ICD-10-CM

## 2022-03-14 DIAGNOSIS — N92.1 MENORRHAGIA WITH IRREGULAR CYCLE: ICD-10-CM

## 2022-03-14 DIAGNOSIS — D27.1 DERMOID CYST OF LEFT OVARY: ICD-10-CM

## 2022-03-14 DIAGNOSIS — R93.89 THICKENED ENDOMETRIUM: ICD-10-CM

## 2022-03-14 DIAGNOSIS — Z76.89 ENCOUNTER TO ESTABLISH CARE WITH NEW DOCTOR: Primary | ICD-10-CM

## 2022-03-14 PROCEDURE — 99204 OFFICE O/P NEW MOD 45 MIN: CPT | Performed by: OBSTETRICS & GYNECOLOGY

## 2022-03-14 ASSESSMENT — ENCOUNTER SYMPTOMS
ALLERGIC/IMMUNOLOGIC NEGATIVE: 1
EYES NEGATIVE: 1
GASTROINTESTINAL NEGATIVE: 1
BACK PAIN: 1
RESPIRATORY NEGATIVE: 1

## 2022-03-14 NOTE — PROGRESS NOTES
Pt is here today regarding CT results from 12/27/2021. 2.8 cm fat-containing left adnexal mass, ovarian dermoid   Considered     Pt states she has continuous left lower abdominal pain.  Pt states 16 years ago she had the \"E-Core\"

## 2022-03-21 ENCOUNTER — HOSPITAL ENCOUNTER (OUTPATIENT)
Dept: ULTRASOUND IMAGING | Age: 42
Discharge: HOME | End: 2022-03-21
Payer: COMMERCIAL

## 2022-03-21 DIAGNOSIS — N83.202 LEFT OVARIAN CYST: ICD-10-CM

## 2022-03-21 DIAGNOSIS — N92.1 MENORRHAGIA WITH IRREGULAR CYCLE: ICD-10-CM

## 2022-03-21 DIAGNOSIS — D27.1 DERMOID CYST OF LEFT OVARY: ICD-10-CM

## 2022-03-21 DIAGNOSIS — R93.89 THICKENED ENDOMETRIUM: ICD-10-CM

## 2022-03-21 PROCEDURE — 76830 TRANSVAGINAL US NON-OB: CPT

## 2022-04-14 ENCOUNTER — TELEPHONE (OUTPATIENT)
Dept: OBGYN CLINIC | Age: 42
End: 2022-04-14

## 2022-04-14 NOTE — TELEPHONE ENCOUNTER
----- Message from Dot Bush RN sent at 4/14/2022 11:09 AM CDT -----  Recommend diagnostic lap, left ovarian cystectomy with Bharat Baltazar

## 2022-04-14 NOTE — TELEPHONE ENCOUNTER
Called pt to confirm that the cyst on her left ovary needs to be removed, she already has her surgery scheduled.

## 2022-04-18 ENCOUNTER — OFFICE VISIT (OUTPATIENT)
Dept: OBGYN CLINIC | Age: 42
End: 2022-04-18
Payer: COMMERCIAL

## 2022-04-18 VITALS
SYSTOLIC BLOOD PRESSURE: 131 MMHG | HEIGHT: 65 IN | HEART RATE: 84 BPM | WEIGHT: 247 LBS | BODY MASS INDEX: 41.15 KG/M2 | DIASTOLIC BLOOD PRESSURE: 83 MMHG

## 2022-04-18 DIAGNOSIS — Z12.4 SCREENING FOR CERVICAL CANCER: ICD-10-CM

## 2022-04-18 DIAGNOSIS — N83.202 LEFT OVARIAN CYST: Primary | ICD-10-CM

## 2022-04-18 DIAGNOSIS — Z11.51 SCREENING FOR HPV (HUMAN PAPILLOMAVIRUS): ICD-10-CM

## 2022-04-18 DIAGNOSIS — Z71.2 ENCOUNTER TO DISCUSS TEST RESULTS: ICD-10-CM

## 2022-04-18 PROCEDURE — 99213 OFFICE O/P EST LOW 20 MIN: CPT | Performed by: NURSE PRACTITIONER

## 2022-04-18 ASSESSMENT — ENCOUNTER SYMPTOMS
GASTROINTESTINAL NEGATIVE: 1
EYES NEGATIVE: 1
RESPIRATORY NEGATIVE: 1

## 2022-04-18 NOTE — PROGRESS NOTES
Avril Simmons is a 42 y.o. female who presents today for her medical conditions/ complaints as noted below. Avril Simmons is c/o of Pre-op Exam (L cystectomy, EUA) and Gynecologic Exam        HPI  Pt presents for her pre op visit for a Left Laparoscopic Cystectomy with Vear Hides on 5-3-22 for a left dermoid cyst.   US results inconclusive on whether cyst is gone or limited imaging due to body habitus. Pt is aware. Needs pap smear today as well. Patient's last menstrual period was 04/04/2022 (approximate). No obstetric history on file. Past Medical History:   Diagnosis Date    Back pain      Past Surgical History:   Procedure Laterality Date    LIVER BIOPSY  06/2021    mild steatosis    UPPER GASTROINTESTINAL ENDOSCOPY N/A 01/04/2022    Dr Piper Daily-w/EUS-No obvious evidence of biliary dilation or bile duct filling defect     Family History   Problem Relation Age of Onset    Colon Cancer Neg Hx     Colon Polyps Neg Hx      Social History     Tobacco Use    Smoking status: Current Every Day Smoker     Packs/day: 1.00     Types: Cigarettes     Start date: 1997    Smokeless tobacco: Never Used   Substance Use Topics    Alcohol use: Not Currently       Current Outpatient Medications   Medication Sig Dispense Refill    oxyCODONE-acetaminophen (PERCOCET)  MG per tablet Take 1 tablet by mouth every 6 hours as needed for Pain.  gabapentin (NEURONTIN) 300 MG capsule Take 300 mg by mouth every 8 hours.  tiZANidine (ZANAFLEX) 4 MG tablet Take 4 mg by mouth every 8 hours as needed (spasms)       No current facility-administered medications for this visit. No Known Allergies  Vitals:    04/18/22 1150   BP: 131/83   Pulse: 84     Body mass index is 41.1 kg/m². Review of Systems   Constitutional: Negative. HENT: Negative. Eyes: Negative. Respiratory: Negative. Cardiovascular: Negative. Gastrointestinal: Negative. Genitourinary: Positive for menstrual problem.  Negative for difficulty urinating, dyspareunia, dysuria, enuresis, frequency, hematuria, pelvic pain, urgency and vaginal discharge. Musculoskeletal: Negative. Skin: Negative. Neurological: Negative. Psychiatric/Behavioral: Negative. Physical Exam  Vitals and nursing note reviewed. Constitutional:       General: She is not in acute distress. Appearance: She is well-developed. She is not diaphoretic. HENT:      Head: Normocephalic and atraumatic. Eyes:      Conjunctiva/sclera: Conjunctivae normal.      Pupils: Pupils are equal, round, and reactive to light. Pulmonary:      Effort: Pulmonary effort is normal.   Abdominal:      Tenderness: There is no guarding. Genitourinary:     Comments: Pap collected  Musculoskeletal:         General: Normal range of motion. Cervical back: Normal range of motion. Comments: Normal ROM in all 4 extremities; normal gait   Skin:     General: Skin is warm and dry. Neurological:      Mental Status: She is alert and oriented to person, place, and time. Motor: No abnormal muscle tone. Coordination: Coordination normal.   Psychiatric:         Behavior: Behavior normal.          Diagnosis Orders   1. Left ovarian cyst     2. Encounter to discuss test results     3. Screening for cervical cancer  PAP SMEAR   4. Screening for HPV (human papillomavirus)  Human papillomavirus (HPV) DNA probe thin prep high risk       MEDICATIONS:  No orders of the defined types were placed in this encounter. ORDERS:  Orders Placed This Encounter   Procedures    PAP SMEAR    Human papillomavirus (HPV) DNA probe thin prep high risk       PLAN:    Pap collected  Plan:  1. Consent signed for a Left Laparoscopic cystectomy with Laurie Medrano on 5-3-22. Procedure and risks discussed. Risks include possible left oophorectomy, bleeding and infection, injury to bowel, bladder, ureters. All questions answered. 2. RTC 2 weeks post op.    There are no Patient Instructions on file for this visit.

## 2022-04-25 LAB
HPV COMMENT: NORMAL
HPV TYPE 16: NOT DETECTED
HPV TYPE 18: NOT DETECTED
HPVOH (OTHER TYPES): NOT DETECTED

## 2022-04-28 ENCOUNTER — HOSPITAL ENCOUNTER (OUTPATIENT)
Dept: PREADMISSION TESTING | Age: 42
Discharge: HOME | End: 2022-05-02
Payer: COMMERCIAL

## 2022-04-28 VITALS — HEIGHT: 65 IN | BODY MASS INDEX: 40.82 KG/M2 | WEIGHT: 245 LBS

## 2022-04-28 LAB
BASOPHILS ABSOLUTE: 0 K/UL (ref 0–0.2)
BASOPHILS RELATIVE PERCENT: 0.3 % (ref 0–1)
EOSINOPHILS ABSOLUTE: 0.2 K/UL (ref 0–0.6)
EOSINOPHILS RELATIVE PERCENT: 2.1 % (ref 0–5)
HCT VFR BLD CALC: 45.4 % (ref 37–47)
HEMOGLOBIN: 15.5 G/DL (ref 12–16)
IMMATURE GRANULOCYTES #: 0 K/UL
LYMPHOCYTES ABSOLUTE: 3.2 K/UL (ref 1.1–4.5)
LYMPHOCYTES RELATIVE PERCENT: 34.9 % (ref 20–40)
MCH RBC QN AUTO: 30.3 PG (ref 27–31)
MCHC RBC AUTO-ENTMCNC: 34.1 G/DL (ref 33–37)
MCV RBC AUTO: 88.8 FL (ref 81–99)
MONOCYTES ABSOLUTE: 0.6 K/UL (ref 0–0.9)
MONOCYTES RELATIVE PERCENT: 6.5 % (ref 0–10)
NEUTROPHILS ABSOLUTE: 5.1 K/UL (ref 1.5–7.5)
NEUTROPHILS RELATIVE PERCENT: 56 % (ref 50–65)
PDW BLD-RTO: 13.6 % (ref 11.5–14.5)
PLATELET # BLD: 172 K/UL (ref 130–400)
PMV BLD AUTO: 13 FL (ref 9.4–12.3)
RBC # BLD: 5.11 M/UL (ref 4.2–5.4)
WBC # BLD: 9.1 K/UL (ref 4.8–10.8)

## 2022-04-28 PROCEDURE — 85025 COMPLETE CBC W/AUTO DIFF WBC: CPT

## 2022-04-28 RX ORDER — PHENAZOPYRIDINE HYDROCHLORIDE 100 MG/1
100 TABLET, FILM COATED ORAL ONCE
Status: CANCELLED | OUTPATIENT
Start: 2022-05-03

## 2022-05-03 ENCOUNTER — HOSPITAL ENCOUNTER (OUTPATIENT)
Age: 42
Setting detail: OUTPATIENT SURGERY
Discharge: HOME | End: 2022-05-03
Attending: OBSTETRICS & GYNECOLOGY | Admitting: OBSTETRICS & GYNECOLOGY
Payer: COMMERCIAL

## 2022-05-03 ENCOUNTER — ANESTHESIA (OUTPATIENT)
Dept: OPERATING ROOM | Age: 42
End: 2022-05-03
Payer: COMMERCIAL

## 2022-05-03 ENCOUNTER — ANESTHESIA EVENT (OUTPATIENT)
Dept: OPERATING ROOM | Age: 42
End: 2022-05-03
Payer: COMMERCIAL

## 2022-05-03 VITALS
WEIGHT: 245 LBS | BODY MASS INDEX: 40.82 KG/M2 | TEMPERATURE: 97.9 F | OXYGEN SATURATION: 100 % | HEART RATE: 63 BPM | SYSTOLIC BLOOD PRESSURE: 119 MMHG | RESPIRATION RATE: 16 BRPM | HEIGHT: 65 IN | DIASTOLIC BLOOD PRESSURE: 79 MMHG

## 2022-05-03 VITALS — OXYGEN SATURATION: 100 % | TEMPERATURE: 97.5 F | SYSTOLIC BLOOD PRESSURE: 98 MMHG | DIASTOLIC BLOOD PRESSURE: 51 MMHG

## 2022-05-03 LAB — HCG(URINE) PREGNANCY TEST: NEGATIVE

## 2022-05-03 PROCEDURE — 2500000003 HC RX 250 WO HCPCS: Performed by: OBSTETRICS & GYNECOLOGY

## 2022-05-03 PROCEDURE — 88307 TISSUE EXAM BY PATHOLOGIST: CPT

## 2022-05-03 PROCEDURE — 88305 TISSUE EXAM BY PATHOLOGIST: CPT

## 2022-05-03 PROCEDURE — 3600000009 HC SURGERY ROBOT BASE: Performed by: OBSTETRICS & GYNECOLOGY

## 2022-05-03 PROCEDURE — 2500000003 HC RX 250 WO HCPCS

## 2022-05-03 PROCEDURE — 2709999900 HC NON-CHARGEABLE SUPPLY: Performed by: OBSTETRICS & GYNECOLOGY

## 2022-05-03 PROCEDURE — 7100000001 HC PACU RECOVERY - ADDTL 15 MIN: Performed by: OBSTETRICS & GYNECOLOGY

## 2022-05-03 PROCEDURE — 6360000002 HC RX W HCPCS: Performed by: NURSE ANESTHETIST, CERTIFIED REGISTERED

## 2022-05-03 PROCEDURE — 7100000011 HC PHASE II RECOVERY - ADDTL 15 MIN: Performed by: OBSTETRICS & GYNECOLOGY

## 2022-05-03 PROCEDURE — 2500000003 HC RX 250 WO HCPCS: Performed by: ANESTHESIOLOGY

## 2022-05-03 PROCEDURE — 7100000010 HC PHASE II RECOVERY - FIRST 15 MIN: Performed by: OBSTETRICS & GYNECOLOGY

## 2022-05-03 PROCEDURE — 7100000000 HC PACU RECOVERY - FIRST 15 MIN: Performed by: OBSTETRICS & GYNECOLOGY

## 2022-05-03 PROCEDURE — 2580000003 HC RX 258: Performed by: ANESTHESIOLOGY

## 2022-05-03 PROCEDURE — 2500000003 HC RX 250 WO HCPCS: Performed by: NURSE ANESTHETIST, CERTIFIED REGISTERED

## 2022-05-03 PROCEDURE — 3700000000 HC ANESTHESIA ATTENDED CARE: Performed by: OBSTETRICS & GYNECOLOGY

## 2022-05-03 PROCEDURE — 6360000002 HC RX W HCPCS: Performed by: ANESTHESIOLOGY

## 2022-05-03 PROCEDURE — 3600000019 HC SURGERY ROBOT ADDTL 15MIN: Performed by: OBSTETRICS & GYNECOLOGY

## 2022-05-03 PROCEDURE — 58558 HYSTEROSCOPY BIOPSY: CPT | Performed by: OBSTETRICS & GYNECOLOGY

## 2022-05-03 PROCEDURE — 6370000000 HC RX 637 (ALT 250 FOR IP): Performed by: ANESTHESIOLOGY

## 2022-05-03 PROCEDURE — 2720000010 HC SURG SUPPLY STERILE: Performed by: OBSTETRICS & GYNECOLOGY

## 2022-05-03 PROCEDURE — 58661 LAPAROSCOPY REMOVE ADNEXA: CPT | Performed by: OBSTETRICS & GYNECOLOGY

## 2022-05-03 PROCEDURE — A4216 STERILE WATER/SALINE, 10 ML: HCPCS | Performed by: ANESTHESIOLOGY

## 2022-05-03 PROCEDURE — S2900 ROBOTIC SURGICAL SYSTEM: HCPCS | Performed by: OBSTETRICS & GYNECOLOGY

## 2022-05-03 PROCEDURE — 6360000002 HC RX W HCPCS

## 2022-05-03 PROCEDURE — 6360000002 HC RX W HCPCS: Performed by: OBSTETRICS & GYNECOLOGY

## 2022-05-03 PROCEDURE — 3700000001 HC ADD 15 MINUTES (ANESTHESIA): Performed by: OBSTETRICS & GYNECOLOGY

## 2022-05-03 PROCEDURE — 84703 CHORIONIC GONADOTROPIN ASSAY: CPT

## 2022-05-03 RX ORDER — DEXAMETHASONE SODIUM PHOSPHATE 10 MG/ML
INJECTION, SOLUTION INTRAMUSCULAR; INTRAVENOUS PRN
Status: DISCONTINUED | OUTPATIENT
Start: 2022-05-03 | End: 2022-05-03 | Stop reason: SDUPTHER

## 2022-05-03 RX ORDER — ROCURONIUM BROMIDE 10 MG/ML
INJECTION, SOLUTION INTRAVENOUS PRN
Status: DISCONTINUED | OUTPATIENT
Start: 2022-05-03 | End: 2022-05-03 | Stop reason: SDUPTHER

## 2022-05-03 RX ORDER — DEXMEDETOMIDINE HYDROCHLORIDE 100 UG/ML
INJECTION, SOLUTION INTRAVENOUS PRN
Status: DISCONTINUED | OUTPATIENT
Start: 2022-05-03 | End: 2022-05-03 | Stop reason: SDUPTHER

## 2022-05-03 RX ORDER — LIDOCAINE HYDROCHLORIDE 10 MG/ML
INJECTION, SOLUTION EPIDURAL; INFILTRATION; INTRACAUDAL; PERINEURAL PRN
Status: DISCONTINUED | OUTPATIENT
Start: 2022-05-03 | End: 2022-05-03 | Stop reason: SDUPTHER

## 2022-05-03 RX ORDER — BUPIVACAINE HYDROCHLORIDE AND EPINEPHRINE 2.5; 5 MG/ML; UG/ML
INJECTION, SOLUTION INFILTRATION; PERINEURAL PRN
Status: DISCONTINUED | OUTPATIENT
Start: 2022-05-03 | End: 2022-05-03 | Stop reason: HOSPADM

## 2022-05-03 RX ORDER — HYDROMORPHONE HYDROCHLORIDE 1 MG/ML
0.5 INJECTION, SOLUTION INTRAMUSCULAR; INTRAVENOUS; SUBCUTANEOUS EVERY 5 MIN PRN
Status: DISCONTINUED | OUTPATIENT
Start: 2022-05-03 | End: 2022-05-03 | Stop reason: HOSPADM

## 2022-05-03 RX ORDER — SCOLOPAMINE TRANSDERMAL SYSTEM 1 MG/1
1 PATCH, EXTENDED RELEASE TRANSDERMAL
Status: DISCONTINUED | OUTPATIENT
Start: 2022-05-03 | End: 2022-05-03 | Stop reason: HOSPADM

## 2022-05-03 RX ORDER — ONDANSETRON 2 MG/ML
INJECTION INTRAMUSCULAR; INTRAVENOUS PRN
Status: DISCONTINUED | OUTPATIENT
Start: 2022-05-03 | End: 2022-05-03 | Stop reason: SDUPTHER

## 2022-05-03 RX ORDER — ONDANSETRON 2 MG/ML
4 INJECTION INTRAMUSCULAR; INTRAVENOUS
Status: DISCONTINUED | OUTPATIENT
Start: 2022-05-03 | End: 2022-05-03 | Stop reason: HOSPADM

## 2022-05-03 RX ORDER — PROPOFOL 10 MG/ML
INJECTION, EMULSION INTRAVENOUS PRN
Status: DISCONTINUED | OUTPATIENT
Start: 2022-05-03 | End: 2022-05-03 | Stop reason: SDUPTHER

## 2022-05-03 RX ORDER — DEXAMETHASONE SODIUM PHOSPHATE 4 MG/ML
4 INJECTION, SOLUTION INTRA-ARTICULAR; INTRALESIONAL; INTRAMUSCULAR; INTRAVENOUS; SOFT TISSUE ONCE
Status: COMPLETED | OUTPATIENT
Start: 2022-05-03 | End: 2022-05-03

## 2022-05-03 RX ORDER — SODIUM CHLORIDE, SODIUM LACTATE, POTASSIUM CHLORIDE, CALCIUM CHLORIDE 600; 310; 30; 20 MG/100ML; MG/100ML; MG/100ML; MG/100ML
INJECTION, SOLUTION INTRAVENOUS CONTINUOUS
Status: DISCONTINUED | OUTPATIENT
Start: 2022-05-03 | End: 2022-05-03 | Stop reason: HOSPADM

## 2022-05-03 RX ORDER — HYDROMORPHONE HYDROCHLORIDE 1 MG/ML
0.25 INJECTION, SOLUTION INTRAMUSCULAR; INTRAVENOUS; SUBCUTANEOUS EVERY 5 MIN PRN
Status: DISCONTINUED | OUTPATIENT
Start: 2022-05-03 | End: 2022-05-03 | Stop reason: HOSPADM

## 2022-05-03 RX ORDER — SUCCINYLCHOLINE CHLORIDE 20 MG/ML
INJECTION INTRAMUSCULAR; INTRAVENOUS PRN
Status: DISCONTINUED | OUTPATIENT
Start: 2022-05-03 | End: 2022-05-03 | Stop reason: SDUPTHER

## 2022-05-03 RX ORDER — FENTANYL CITRATE 50 UG/ML
INJECTION, SOLUTION INTRAMUSCULAR; INTRAVENOUS PRN
Status: DISCONTINUED | OUTPATIENT
Start: 2022-05-03 | End: 2022-05-03 | Stop reason: SDUPTHER

## 2022-05-03 RX ORDER — KETOROLAC TROMETHAMINE 30 MG/ML
INJECTION, SOLUTION INTRAMUSCULAR; INTRAVENOUS PRN
Status: DISCONTINUED | OUTPATIENT
Start: 2022-05-03 | End: 2022-05-03 | Stop reason: SDUPTHER

## 2022-05-03 RX ORDER — IBUPROFEN 800 MG/1
800 TABLET ORAL EVERY 8 HOURS PRN
Qty: 90 TABLET | Refills: 0 | Status: SHIPPED | OUTPATIENT
Start: 2022-05-03

## 2022-05-03 RX ADMIN — SUGAMMADEX 250 MG: 100 INJECTION, SOLUTION INTRAVENOUS at 14:55

## 2022-05-03 RX ADMIN — LIDOCAINE HYDROCHLORIDE 5 ML: 10 INJECTION, SOLUTION EPIDURAL; INFILTRATION; INTRACAUDAL; PERINEURAL at 13:52

## 2022-05-03 RX ADMIN — FENTANYL CITRATE 100 MCG: 50 INJECTION, SOLUTION INTRAMUSCULAR; INTRAVENOUS at 13:52

## 2022-05-03 RX ADMIN — HYDROMORPHONE HYDROCHLORIDE 0.5 MG: 1 INJECTION, SOLUTION INTRAMUSCULAR; INTRAVENOUS; SUBCUTANEOUS at 15:14

## 2022-05-03 RX ADMIN — PROPOFOL 50 MG: 10 INJECTION, EMULSION INTRAVENOUS at 13:53

## 2022-05-03 RX ADMIN — FENTANYL CITRATE 50 MCG: 50 INJECTION, SOLUTION INTRAMUSCULAR; INTRAVENOUS at 15:03

## 2022-05-03 RX ADMIN — ROCURONIUM BROMIDE 20 MG: 10 INJECTION, SOLUTION INTRAVENOUS at 14:24

## 2022-05-03 RX ADMIN — SODIUM CHLORIDE, POTASSIUM CHLORIDE, SODIUM LACTATE AND CALCIUM CHLORIDE: 600; 310; 30; 20 INJECTION, SOLUTION INTRAVENOUS at 09:56

## 2022-05-03 RX ADMIN — ROCURONIUM BROMIDE 5 MG: 10 INJECTION, SOLUTION INTRAVENOUS at 13:52

## 2022-05-03 RX ADMIN — DEXAMETHASONE SODIUM PHOSPHATE 4 MG: 4 INJECTION, SOLUTION INTRAMUSCULAR; INTRAVENOUS at 10:36

## 2022-05-03 RX ADMIN — ROCURONIUM BROMIDE 45 MG: 10 INJECTION, SOLUTION INTRAVENOUS at 14:00

## 2022-05-03 RX ADMIN — HYDROMORPHONE HYDROCHLORIDE 0.25 MG: 1 INJECTION, SOLUTION INTRAMUSCULAR; INTRAVENOUS; SUBCUTANEOUS at 15:35

## 2022-05-03 RX ADMIN — HYDROMORPHONE HYDROCHLORIDE 0.25 MG: 1 INJECTION, SOLUTION INTRAMUSCULAR; INTRAVENOUS; SUBCUTANEOUS at 15:26

## 2022-05-03 RX ADMIN — DEXAMETHASONE SODIUM PHOSPHATE 10 MG: 10 INJECTION, SOLUTION INTRAMUSCULAR; INTRAVENOUS at 14:06

## 2022-05-03 RX ADMIN — FENTANYL CITRATE 25 MCG: 50 INJECTION, SOLUTION INTRAMUSCULAR; INTRAVENOUS at 14:50

## 2022-05-03 RX ADMIN — KETOROLAC TROMETHAMINE 30 MG: 30 INJECTION, SOLUTION INTRAMUSCULAR; INTRAVENOUS at 14:55

## 2022-05-03 RX ADMIN — FENTANYL CITRATE 25 MCG: 50 INJECTION, SOLUTION INTRAMUSCULAR; INTRAVENOUS at 14:41

## 2022-05-03 RX ADMIN — SUCCINYLCHOLINE CHLORIDE 100 MG: 20 INJECTION, SOLUTION INTRAMUSCULAR; INTRAVENOUS at 13:54

## 2022-05-03 RX ADMIN — FENTANYL CITRATE 50 MCG: 50 INJECTION, SOLUTION INTRAMUSCULAR; INTRAVENOUS at 14:11

## 2022-05-03 RX ADMIN — ONDANSETRON 4 MG: 2 INJECTION INTRAMUSCULAR; INTRAVENOUS at 14:44

## 2022-05-03 RX ADMIN — FAMOTIDINE 20 MG: 10 INJECTION INTRAVENOUS at 10:36

## 2022-05-03 RX ADMIN — Medication 2000 MG: at 14:03

## 2022-05-03 RX ADMIN — PROPOFOL 100 MG: 10 INJECTION, EMULSION INTRAVENOUS at 13:52

## 2022-05-03 RX ADMIN — DEXMEDETOMIDINE HYDROCHLORIDE 20 MCG: 100 INJECTION, SOLUTION, CONCENTRATE INTRAVENOUS at 13:48

## 2022-05-03 ASSESSMENT — ENCOUNTER SYMPTOMS
GASTROINTESTINAL NEGATIVE: 1
EYES NEGATIVE: 1
RESPIRATORY NEGATIVE: 1
ALLERGIC/IMMUNOLOGIC NEGATIVE: 1

## 2022-05-03 ASSESSMENT — PAIN SCALES - GENERAL
PAINLEVEL_OUTOF10: 4
PAINLEVEL_OUTOF10: 6
PAINLEVEL_OUTOF10: 8
PAINLEVEL_OUTOF10: 4
PAINLEVEL_OUTOF10: 6
PAINLEVEL_OUTOF10: 4

## 2022-05-03 ASSESSMENT — PAIN DESCRIPTION - LOCATION
LOCATION: ABDOMEN

## 2022-05-03 ASSESSMENT — LIFESTYLE VARIABLES: SMOKING_STATUS: 1

## 2022-05-03 ASSESSMENT — PAIN DESCRIPTION - DESCRIPTORS
DESCRIPTORS: CRAMPING

## 2022-05-03 NOTE — ANESTHESIA PRE PROCEDURE
Department of Anesthesiology  Preprocedure Note       Name:  Megan Camera   Age:  42 y.o.  :  1980                                          MRN:  [id number]         Date:  5/3/2022      Surgeon: Mario Ramos):  Denia Boateng MD    Procedure: Procedure(s):  ROBOTIC LEFT LAPAROSCOPIC CYSTECTOMY, EUA    Medications prior to admission:   Prior to Admission medications    Medication Sig Start Date End Date Taking? Authorizing Provider   oxyCODONE-acetaminophen (PERCOCET)  MG per tablet Take 1 tablet by mouth every 6 hours as needed for Pain. Historical Provider, MD   gabapentin (NEURONTIN) 300 MG capsule Take 300 mg by mouth every 8 hours.     Historical Provider, MD   tiZANidine (ZANAFLEX) 4 MG tablet Take 4 mg by mouth every 8 hours as needed (spasms)    Historical Provider, MD       Current medications:    Current Facility-Administered Medications   Medication Dose Route Frequency Provider Last Rate Last Admin    ceFAZolin (ANCEF) 2000 mg in 0.9% sodium chloride 50 mL IVPB  2,000 mg IntraVENous Once Denia Boateng MD        lactated ringers infusion   IntraVENous Continuous Myranda Mera  mL/hr at 22 0956 New Bag at 22 0956       Allergies:  No Known Allergies    Problem List:    Patient Active Problem List   Diagnosis Code    Adnexal mass N94.89    Tobacco abuse counseling Z71.6    Dependence on nicotine from cigarettes F17.210    Morbid obesity ([address] E66.01    Elevated LFTs R79.89    Obstructive jaundice K83.1       Past Medical History:        Diagnosis Date    Chronic back pain        Past Surgical History:        Procedure Laterality Date    BLADDER SUSPENSION       SECTION      X 2    CHOLECYSTECTOMY      LIVER BIOPSY  2021    mild steatosis    NECK SURGERY      TONSILLECTOMY      UPPER GASTROINTESTINAL ENDOSCOPY N/A 2022    Dr Adrian Daily-w/EUS-No obvious evidence of biliary dilation or bile duct filling defect       Social History: Social History     Tobacco Use    Smoking status: Current Every Day Smoker     Packs/day: 1.00     Years: 24.00     Pack years: 24.00     Types: Cigarettes     Start date: 1997    Smokeless tobacco: Never Used   Substance Use Topics    Alcohol use: Yes     Comment: RARELY                                Ready to quit: Not Answered  Counseling given: Not Answered      Vital Signs (Current):   Vitals:    05/03/22 0950   BP: (!) 145/101   Pulse: 89   Resp: 14   Temp: 97.4 °F (36.3 °C)   TempSrc: Tympanic   SpO2: 100%   Weight: 245 lb (111.1 kg)   Height: 5' 5\" (1.651 m)                                              BP Readings from Last 3 Encounters:   05/03/22 (!) 145/101   04/18/22 131/83   03/14/22 138/84       NPO Status: Time of last liquid consumption: 1300                        Time of last solid consumption: 1300                        Date of last liquid consumption: 05/02/22                        Date of last solid food consumption: 05/02/22    BMI:   Wt Readings from Last 3 Encounters:   05/03/22 245 lb (111.1 kg)   04/28/22 245 lb (111.1 kg)   04/18/22 247 lb (112 kg)     Body mass index is 40.77 kg/m². CBC:   Lab Results   Component Value Date    WBC 9.1 04/28/2022    RBC 5.11 04/28/2022    HGB 15.5 04/28/2022    HCT 45.4 04/28/2022    MCV 88.8 04/28/2022    RDW 13.6 04/28/2022     04/28/2022       CMP:   Lab Results   Component Value Date     01/18/2022    K 3.6 01/18/2022     01/18/2022    CO2 26 01/18/2022    BUN 10 01/18/2022    CREATININE 0.6 01/18/2022    GFRAA >59 01/18/2022    LABGLOM >60 01/18/2022    GLUCOSE 91 01/18/2022    PROT 6.9 01/18/2022    CALCIUM 9.2 01/18/2022    BILITOT 1.0 01/18/2022    ALKPHOS 85 01/18/2022    AST 32 01/18/2022    ALT 33 01/18/2022       POC Tests: No results for input(s): POCGLU, POCNA, POCK, POCCL, POCBUN, POCHEMO, POCHCT in the last 72 hours.     Coags:   Lab Results   Component Value Date    PROTIME 13.4 01/01/2022    INR 1.00 01/01/2022 APTT 27.4 12/27/2021       HCG (If Applicable): No results found for: PREGTESTUR, PREGSERUM, HCG, HCGQUANT     ABGs: No results found for: PHART, PO2ART, NNW6QSH, CDX3EUL, BEART, [id number]     Type & Screen (If Applicable):  No results found for: LABABO, LABRH    Drug/Infectious Status (If Applicable):  No results found for: HIV, HEPCAB    COVID-19 Screening (If Applicable):   Lab Results   Component Value Date    COVID19 Not Detected 12/27/2021           Anesthesia Evaluation  Patient summary reviewed no history of anesthetic complications:   Airway: Mallampati: I  TM distance: >3 FB   Neck ROM: full  Mouth opening: > = 3 FB Dental:    (+) poor dentition      Pulmonary:normal exam  breath sounds clear to auscultation  (+) current smoker    (-) COPD, asthma, recent URI and sleep apnea          Patient smoked on day of surgery. Cardiovascular:  Exercise tolerance: good (>4 METS),       (-) pacemaker, hypertension, past MI, CABG/stent and  angina      Rhythm: regular  Rate: normal           Beta Blocker:  Not on Beta Blocker         Neuro/Psych:      (-) seizures, TIA and CVA           GI/Hepatic/Renal:   (+) GERD: well controlled, morbid obesity     (-) liver disease and no renal disease       Endo/Other:        (-) diabetes mellitus, hypothyroidism, hyperthyroidism               Abdominal:   (+) obese,           Vascular:     - DVT. Other Findings:             Anesthesia Plan      general     ASA 3     (Preop famotidine, dexamethasone, scopolamine)  Induction: intravenous. MIPS: Postoperative opioids intended and Prophylactic antiemetics administered. Anesthetic plan and risks discussed with patient and spouse. Use of blood products discussed with patient and spouse whom consented to blood products.                    Caryle Grief, MD   5/3/2022

## 2022-05-03 NOTE — PROGRESS NOTES
CLINICAL PHARMACY NOTE: MEDS TO BEDS    Total # of Prescriptions Filled: 1   The following medications were delivered to the patient:  · Ibuprofen 800mg    Additional Documentation:    Delivered to patients spouse in [address] [place] [address] room 5, paid cash

## 2022-05-03 NOTE — H&P
HPI  Eduardo Cole is a 42 y.o. female with h/o ovarian dermoid who presents for surgical management. She is doing well without complaints. Review of Systems   Constitutional: Negative. HENT: Negative. Eyes: Negative. Respiratory: Negative. Cardiovascular: Negative. Gastrointestinal: Negative. Endocrine: Negative. Genitourinary: Positive for pelvic pain. Musculoskeletal: Negative. Skin: Negative. Allergic/Immunologic: Negative. Neurological: Negative. Hematological: Negative. Psychiatric/Behavioral: Negative. Past Medical History:   Diagnosis Date    Chronic back pain      Past Surgical History:   Procedure Laterality Date    BLADDER SUSPENSION       SECTION      X 2    CHOLECYSTECTOMY      LIVER BIOPSY  2021    mild steatosis    NECK SURGERY      TONSILLECTOMY      UPPER GASTROINTESTINAL ENDOSCOPY N/A 2022    Dr Krystal Daily-w/EUS-No obvious evidence of biliary dilation or bile duct filling defect     Family History   Problem Relation Age of Onset    Kidney Disease Mother         STAGE 4    Diabetes Mother     Hypotension Mother     Prostate Cancer Father     Diabetes Father         IDDM    Colon Cancer Neg Hx     Colon Polyps Neg Hx      Social History     Tobacco Use    Smoking status: Current Every Day Smoker     Packs/day: 1.00     Years: 24.00     Pack years: 24.00     Types: Cigarettes     Start date:     Smokeless tobacco: Never Used   Vaping Use    Vaping Use: Never used   Substance Use Topics    Alcohol use: Yes     Comment: RARELY    Drug use: Not Currently     No current facility-administered medications on file prior to encounter. Current Outpatient Medications on File Prior to Encounter   Medication Sig Dispense Refill    oxyCODONE-acetaminophen (PERCOCET)  MG per tablet Take 1 tablet by mouth every 6 hours as needed for Pain.  gabapentin (NEURONTIN) 300 MG capsule Take 300 mg by mouth every 8 hours.  tiZANidine (ZANAFLEX) 4 MG tablet Take 4 mg by mouth every 8 hours as needed (spasms)       No Known Allergies  BP (!) 145/101   Pulse 89   Temp 97.4 °F (36.3 °C) (Tympanic)   Resp 14   Ht 5' 5\" (1.651 m)   Wt 245 lb (111.1 kg)   LMP 04/04/2022 (Approximate)   SpO2 100%   Breastfeeding No   BMI 40.77 kg/m²   Physical Exam  Constitutional:       General: She is not in acute distress. Appearance: Normal appearance. She is not ill-appearing or diaphoretic. HENT:      Head: Normocephalic and atraumatic. Nose: Nose normal. No rhinorrhea. Eyes:      General: No scleral icterus. Right eye: No discharge. Left eye: No discharge. Extraocular Movements: Extraocular movements intact. Pulmonary:      Effort: Pulmonary effort is normal. No respiratory distress. Musculoskeletal:         General: Normal range of motion. Skin:     Coloration: Skin is not pale. Findings: No erythema or rash. Neurological:      Mental Status: She is alert and oriented to person, place, and time. Psychiatric:         Attention and Perception: Attention and perception normal.         Mood and Affect: Mood and affect normal.         Speech: Speech normal.         Behavior: Behavior normal. Behavior is cooperative. Thought Content: Thought content normal.         Cognition and Memory: Cognition and memory normal.         Judgment: Judgment normal.       Narrative   CT ABDOMEN PELVIS W IV CONTRAST 12/27/2021 5:43 PM   HISTORY: Right-sided abdominal pain. Diffuse onset of jaundice   COMPARISON: None. TECHNIQUE:  Thin section sequential transaxial images were obtained. 2-D sagittal and coronal reconstruction images were generated. Intravenous contrast was administered. Oral contrast was not ingested. Radiation dose equals DLP 1750 mGy cm. AUTOMATED EXPOSURE CONTROL dose   reduction technique was implemented. FINDINGS:    The lung bases are grossly clear.    The gallbladder is surgically absent. Mild decreased liver attenuation   observed compatible with hepatic steatosis. There are no focal liver   lesions. The biliary tree is decompressed without intra or   extrahepatic biliary ductal dilatation. There is mild splenomegaly. The spleen measures 13 cm in [id number]   length. There are no focal splenic lesions. There are no adrenal masses. No pancreatic abnormalities observed. There is no pancreas ductal   dilatation. The kidneys enhance symmetrically. There are no urinary tract calculi. There is no pelvic caliectasis, hydroureter or obstructive uropathy. Urinary bladder is minimally distended without focal bladder wall   abnormality. Changes from tubal ligation observed. The endometrium appears mildly thickened and irregular, correlate with   menstrual cycle and patient presentation. Nonemergent ultrasound may   be considered if clinically indicated. There is a fat-containing left adnexal mass measuring 2.8 cm   suspicious for an ovarian dermoid. GYN consultation recommended. There is stool and gas identified throughout the colon which is   decompressed without dilatation or evidence of obstruction. There is   no CT evidence of diverticular disease. The appendix is not inflamed. The small bowel is not dilated. Duodenal sweep is imaged appropriately. The stomach is decompressed. The celiac axis, SMA and CHE are intact and uncompromised. There are small para-aortic lymph nodes. There are no pathologically enlarged nodes. There is no ascites or pneumoperitoneum. There are spondylosis changes diffusely in the thoracolumbar spine. There is advanced disc degeneration in the lower thoracic spine as   well as the lumbar spine, particularly at L5-S1. There are no acute bony abnormalities.       Impression   1. Mild hepatic steatosis without focal liver lesions. The gallbladder   surgically absent. No biliary ductal dilatation. 2. Mild splenomegaly.    3. Endometrium appears mildly thickened and irregular, correlate with   patient presentation. 4. 2.8 cm fat-containing left adnexal mass, ovarian dermoid   considered. GYN consultation recommended. 5. Otherwise, No CT evidence of acute intra-abdominal/pelvic   pathological process. Signed by Dr Carlos Lopez  1. Dermoid cyst  2. Thickened endometrium    Plan  To OR for Laparoscopic cystectomy, D&C. Risks including bleeding, infection, injury to bowel/bladder/ureters and need for future surgery. Patient states understanding. All questions answered. Consent signed.

## 2022-05-03 NOTE — ANESTHESIA POSTPROCEDURE EVALUATION
Department of Anesthesiology  Postprocedure Note    Patient: Lorena Renteria  MRN: [id number]  YOB: 1980  Date of evaluation: 5/3/2022  Time:  3:07 PM     Procedure Summary     Date: 05/03/22 Room / Location: [address]    Anesthesia Start: 0941 Anesthesia Stop: 9739    Procedure: ROBOTIC LEFT LAPAROSCOPIC SALPINGECTOMY AND LEFT OOPHORECTOMY,  EUA, DILITATION AND CURRETAGE (Left Uterus) Diagnosis:       Left ovarian cyst      Dermoid cyst of ovary, left      (N83.202, D27.1)    Surgeons: Bharat Benjamin MD Responsible Provider: DAVID Faye CRNA    Anesthesia Type: general ASA Status: 3          Anesthesia Type: general    Dedrick Phase I: Dedrick Score: 10    Dedrick Phase II:      Last vitals: Reviewed and per EMR flowsheets.        Anesthesia Post Evaluation    Patient location during evaluation: bedside  Patient participation: complete - patient participated  Level of consciousness: awake and alert  Pain score: 0  Airway patency: patent  Nausea & Vomiting: no nausea  Complications: no  Cardiovascular status: hemodynamically stable  Respiratory status: acceptable  Hydration status: euvolemic

## 2022-05-18 NOTE — OP NOTE
PREOPERATIVE DIAGNOSES:   1. Left ovarian dermoid cyst  2. Thickened endometrium    POSTOPERATIVE DIAGNOSES:   Same     PROCEDURE PERFORMED:    1. EUA  2. Hysteroscopy  3. Dilation  4. Myosure Biopsies  5. Robotic left salpingo-ophorectomy    ANESTHESIA:  General    SURGEON:  Dr. Lenny Villarreal    OR STAFF:  First Assistant: Luis Cavanaugh     ESTIMATED BLOOD LOSS:  Less than 20 mL    COMPLICATIONS:  None    SPECIMENS:  Endometrial curettings, left ovary and fallopian tube    FINDINGS:  Thickened endometrium. Left ovarian cyst c/w dermoid. Essure device noted in left tube and cornua    DESCRIPTION OF PROCEDURE:  The patient was taken to the operating room where she was placed under general anesthesia. She was positioned in dorsal lithotomy, prepped and draped in usual sterile fashion. A weighted speculum was placed into the vagina and the anterior lip of the cervix was grasped with a single-tooth tenaculum. The os was serially dilated to accommodate the hysteroscope which was placed without complication and the above findings were noted. Directed biopsies were taken throughout the uterine cavity. At this time, the hysteroscope was removed. Uterine manipulator was placed and  the tenaculum was removed from the anterior lip of the cervix. Tenaculum sites were found to be hemostatic. Attention was then turned to the abdomen where a supraumbilical incision was made. Veress needle was placed. Placement was confirmed with saline drop testand aspiration of air. Pneumoperitoneum was created. Two 8-mm ports were placed, 10 cm on each side of the umbilicus. An 8-mm assist port was placed in the LLQ. The above findings were noted. Attempts were made to dissect the dermoid from the ovary, but there was no clear cleavage plane. As such, decision was made to remove the entire adnexa. The IP was grasped with the Vessel Sealer, cauterized and cut.   Fallopian tube was freed from the mesosalpinx and

## 2023-09-14 ENCOUNTER — HOSPITAL ENCOUNTER (EMERGENCY)
Age: 43
Discharge: HOME | End: 2023-09-14
Payer: COMMERCIAL

## 2023-09-14 ENCOUNTER — APPOINTMENT (OUTPATIENT)
Dept: CT IMAGING | Age: 43
End: 2023-09-14
Payer: COMMERCIAL

## 2023-09-14 VITALS
RESPIRATION RATE: 15 BRPM | HEART RATE: 78 BPM | OXYGEN SATURATION: 99 % | SYSTOLIC BLOOD PRESSURE: 166 MMHG | HEIGHT: 65 IN | DIASTOLIC BLOOD PRESSURE: 78 MMHG | BODY MASS INDEX: 40.82 KG/M2 | WEIGHT: 245 LBS | TEMPERATURE: 98.5 F

## 2023-09-14 DIAGNOSIS — R10.13 ABDOMINAL PAIN, EPIGASTRIC: Primary | ICD-10-CM

## 2023-09-14 LAB
ALBUMIN SERPL-MCNC: 4 G/DL (ref 3.5–5.2)
ALP SERPL-CCNC: 66 U/L (ref 35–104)
ALT SERPL-CCNC: 17 U/L (ref 5–33)
ANION GAP SERPL CALCULATED.3IONS-SCNC: 10 MMOL/L (ref 7–19)
AST SERPL-CCNC: 18 U/L (ref 5–32)
BACTERIA URNS QL MICRO: NEGATIVE /HPF
BASOPHILS # BLD: 0 K/UL (ref 0–0.2)
BASOPHILS NFR BLD: 0.4 % (ref 0–1)
BILIRUB SERPL-MCNC: 0.3 MG/DL (ref 0.2–1.2)
BILIRUB UR QL STRIP: NEGATIVE
BUN SERPL-MCNC: 7 MG/DL (ref 6–20)
CALCIUM SERPL-MCNC: 8.7 MG/DL (ref 8.6–10)
CHLORIDE SERPL-SCNC: 104 MMOL/L (ref 98–111)
CLARITY UR: ABNORMAL
CO2 SERPL-SCNC: 23 MMOL/L (ref 22–29)
COLOR UR: YELLOW
CREAT SERPL-MCNC: 0.7 MG/DL (ref 0.5–0.9)
CRYSTALS URNS MICRO: ABNORMAL /HPF
EOSINOPHIL # BLD: 0.1 K/UL (ref 0–0.6)
EOSINOPHIL NFR BLD: 0.6 % (ref 0–5)
EPI CELLS #/AREA URNS AUTO: 7 /HPF (ref 0–5)
ERYTHROCYTE [DISTWIDTH] IN BLOOD BY AUTOMATED COUNT: 12.7 % (ref 11.5–14.5)
GLUCOSE SERPL-MCNC: 128 MG/DL (ref 74–109)
GLUCOSE UR STRIP.AUTO-MCNC: NEGATIVE MG/DL
HCT VFR BLD AUTO: 45.3 % (ref 37–47)
HGB BLD-MCNC: 15.2 G/DL (ref 12–16)
HGB UR STRIP.AUTO-MCNC: ABNORMAL MG/L
HYALINE CASTS #/AREA URNS AUTO: 2 /HPF (ref 0–8)
IMM GRANULOCYTES # BLD: 0 K/UL
KETONES UR STRIP.AUTO-MCNC: NEGATIVE MG/DL
LEUKOCYTE ESTERASE UR QL STRIP.AUTO: NEGATIVE
LIPASE SERPL-CCNC: 23 U/L (ref 13–60)
LYMPHOCYTES # BLD: 2.5 K/UL (ref 1.1–4.5)
LYMPHOCYTES NFR BLD: 29.3 % (ref 20–40)
MCH RBC QN AUTO: 28.7 PG (ref 27–31)
MCHC RBC AUTO-ENTMCNC: 33.6 G/DL (ref 33–37)
MCV RBC AUTO: 85.5 FL (ref 81–99)
MONOCYTES # BLD: 0.6 K/UL (ref 0–0.9)
MONOCYTES NFR BLD: 6.4 % (ref 0–10)
NEUTROPHILS # BLD: 5.4 K/UL (ref 1.5–7.5)
NEUTS SEG NFR BLD: 63.2 % (ref 50–65)
NITRITE UR QL STRIP.AUTO: NEGATIVE
PH UR STRIP.AUTO: 6 [PH] (ref 5–8)
PLATELET # BLD AUTO: 153 K/UL (ref 130–400)
PMV BLD AUTO: 12.4 FL (ref 9.4–12.3)
POTASSIUM SERPL-SCNC: 3.4 MMOL/L (ref 3.5–5)
PROT SERPL-MCNC: 7.3 G/DL (ref 6.6–8.7)
PROT UR STRIP.AUTO-MCNC: NEGATIVE MG/DL
RBC # BLD AUTO: 5.3 M/UL (ref 4.2–5.4)
RBC #/AREA URNS AUTO: 5 /HPF (ref 0–4)
SODIUM SERPL-SCNC: 137 MMOL/L (ref 136–145)
SP GR UR STRIP.AUTO: 1.02 (ref 1–1.03)
TROPONIN T SERPL-MCNC: <0.01 NG/ML (ref 0–0.03)
UROBILINOGEN UR STRIP.AUTO-MCNC: 0.2 E.U./DL
WBC # BLD AUTO: 8.6 K/UL (ref 4.8–10.8)
WBC #/AREA URNS AUTO: 1 /HPF (ref 0–5)

## 2023-09-14 PROCEDURE — 81001 URINALYSIS AUTO W/SCOPE: CPT

## 2023-09-14 PROCEDURE — 6360000002 HC RX W HCPCS: Performed by: PHYSICIAN ASSISTANT

## 2023-09-14 PROCEDURE — 99285 EMERGENCY DEPT VISIT HI MDM: CPT

## 2023-09-14 PROCEDURE — 2580000003 HC RX 258: Performed by: PHYSICIAN ASSISTANT

## 2023-09-14 PROCEDURE — 96374 THER/PROPH/DIAG INJ IV PUSH: CPT

## 2023-09-14 PROCEDURE — 85025 COMPLETE CBC W/AUTO DIFF WBC: CPT

## 2023-09-14 PROCEDURE — 84484 ASSAY OF TROPONIN QUANT: CPT

## 2023-09-14 PROCEDURE — 96375 TX/PRO/DX INJ NEW DRUG ADDON: CPT

## 2023-09-14 PROCEDURE — 6360000004 HC RX CONTRAST MEDICATION: Performed by: PHYSICIAN ASSISTANT

## 2023-09-14 PROCEDURE — 74177 CT ABD & PELVIS W/CONTRAST: CPT

## 2023-09-14 PROCEDURE — 93005 ELECTROCARDIOGRAM TRACING: CPT

## 2023-09-14 PROCEDURE — 96361 HYDRATE IV INFUSION ADD-ON: CPT

## 2023-09-14 PROCEDURE — 83690 ASSAY OF LIPASE: CPT

## 2023-09-14 PROCEDURE — 80053 COMPREHEN METABOLIC PANEL: CPT

## 2023-09-14 PROCEDURE — 36415 COLL VENOUS BLD VENIPUNCTURE: CPT

## 2023-09-14 RX ORDER — ONDANSETRON 2 MG/ML
4 INJECTION INTRAMUSCULAR; INTRAVENOUS ONCE
Status: COMPLETED | OUTPATIENT
Start: 2023-09-14 | End: 2023-09-14

## 2023-09-14 RX ORDER — DULOXETIN HYDROCHLORIDE 60 MG/1
60 CAPSULE, DELAYED RELEASE ORAL DAILY
COMMUNITY

## 2023-09-14 RX ORDER — PANTOPRAZOLE SODIUM 40 MG/1
40 TABLET, DELAYED RELEASE ORAL DAILY
COMMUNITY

## 2023-09-14 RX ORDER — MORPHINE SULFATE 4 MG/ML
4 INJECTION, SOLUTION INTRAMUSCULAR; INTRAVENOUS ONCE
Status: COMPLETED | OUTPATIENT
Start: 2023-09-14 | End: 2023-09-14

## 2023-09-14 RX ORDER — 0.9 % SODIUM CHLORIDE 0.9 %
1000 INTRAVENOUS SOLUTION INTRAVENOUS ONCE
Status: COMPLETED | OUTPATIENT
Start: 2023-09-14 | End: 2023-09-14

## 2023-09-14 RX ORDER — ONDANSETRON 2 MG/ML
4 INJECTION INTRAMUSCULAR; INTRAVENOUS ONCE
Status: DISCONTINUED | OUTPATIENT
Start: 2023-09-14 | End: 2023-09-14 | Stop reason: HOSPADM

## 2023-09-14 RX ADMIN — ONDANSETRON 4 MG: 2 INJECTION INTRAMUSCULAR; INTRAVENOUS at 12:22

## 2023-09-14 RX ADMIN — SODIUM CHLORIDE 1000 ML: 9 INJECTION, SOLUTION INTRAVENOUS at 12:22

## 2023-09-14 RX ADMIN — IOPAMIDOL 70 ML: 755 INJECTION, SOLUTION INTRAVENOUS at 12:50

## 2023-09-14 RX ADMIN — MORPHINE SULFATE 4 MG: 4 INJECTION, SOLUTION INTRAMUSCULAR; INTRAVENOUS at 12:22

## 2023-09-14 ASSESSMENT — ENCOUNTER SYMPTOMS
NAUSEA: 1
SHORTNESS OF BREATH: 0
ABDOMINAL PAIN: 1
DIARRHEA: 0
ABDOMINAL DISTENTION: 0
CONSTIPATION: 0
VOMITING: 0
COUGH: 0

## 2023-09-14 ASSESSMENT — PAIN SCALES - GENERAL: PAINLEVEL_OUTOF10: 7

## 2023-09-18 LAB
EKG P AXIS: 38 DEGREES
EKG P-R INTERVAL: 142 MS
EKG Q-T INTERVAL: 330 MS
EKG QRS DURATION: 84 MS
EKG QTC CALCULATION (BAZETT): 391 MS
EKG T AXIS: 35 DEGREES

## 2024-05-08 ENCOUNTER — TRANSCRIBE ORDERS (OUTPATIENT)
Dept: ADMINISTRATIVE | Facility: HOSPITAL | Age: 44
End: 2024-05-08
Payer: COMMERCIAL

## 2024-05-08 DIAGNOSIS — M51.36 OTHER INTERVERTEBRAL DISC DEGENERATION, LUMBAR REGION: Primary | ICD-10-CM

## 2024-05-08 DIAGNOSIS — M51.16 INTERVERTEBRAL DISC DISORDER WITH RADICULOPATHY OF LUMBAR REGION: ICD-10-CM

## 2024-05-08 DIAGNOSIS — M51.37 OTHER INTERVERTEBRAL DISC DEGENERATION, LUMBOSACRAL REGION: ICD-10-CM

## 2024-05-08 DIAGNOSIS — M51.17 INTERVERTEBRAL DISC DISORDERS WITH RADICULOPATHY, LUMBOSACRAL REGION: ICD-10-CM

## 2024-05-10 ENCOUNTER — HOSPITAL ENCOUNTER (OUTPATIENT)
Dept: GENERAL RADIOLOGY | Facility: HOSPITAL | Age: 44
Discharge: HOME | End: 2024-05-10
Admitting: PAIN MEDICINE
Payer: COMMERCIAL

## 2024-05-10 DIAGNOSIS — M51.17 INTERVERTEBRAL DISC DISORDERS WITH RADICULOPATHY, LUMBOSACRAL REGION: ICD-10-CM

## 2024-05-10 DIAGNOSIS — M51.16 INTERVERTEBRAL DISC DISORDER WITH RADICULOPATHY OF LUMBAR REGION: ICD-10-CM

## 2024-05-10 DIAGNOSIS — M51.37 OTHER INTERVERTEBRAL DISC DEGENERATION, LUMBOSACRAL REGION: ICD-10-CM

## 2024-05-10 DIAGNOSIS — M51.36 OTHER INTERVERTEBRAL DISC DEGENERATION, LUMBAR REGION: ICD-10-CM

## 2024-05-10 PROCEDURE — 72100 X-RAY EXAM L-S SPINE 2/3 VWS: CPT

## 2024-06-07 ENCOUNTER — TRANSCRIBE ORDERS (OUTPATIENT)
Dept: ADMINISTRATIVE | Facility: HOSPITAL | Age: 44
End: 2024-06-07
Payer: COMMERCIAL

## 2024-06-07 ENCOUNTER — HOSPITAL ENCOUNTER (OUTPATIENT)
Dept: MRI IMAGING | Facility: HOSPITAL | Age: 44
Discharge: HOME | End: 2024-06-07
Admitting: PAIN MEDICINE
Payer: COMMERCIAL

## 2024-06-07 DIAGNOSIS — M51.16 INTERVERTEBRAL DISC DISORDER WITH RADICULOPATHY OF LUMBAR REGION: ICD-10-CM

## 2024-06-07 DIAGNOSIS — M51.37 OTHER INTERVERTEBRAL DISC DEGENERATION, LUMBOSACRAL REGION: ICD-10-CM

## 2024-06-07 DIAGNOSIS — M51.36 OTHER INTERVERTEBRAL DISC DEGENERATION, LUMBAR REGION: ICD-10-CM

## 2024-06-07 DIAGNOSIS — M51.17 INTERVERTEBRAL DISC DISORDERS WITH RADICULOPATHY, LUMBOSACRAL REGION: ICD-10-CM

## 2024-06-07 PROCEDURE — 72148 MRI LUMBAR SPINE W/O DYE: CPT

## (undated) DEVICE — CUFF BLD PRESSURE 1 TUBE AD 25-34 CM ARM VLY FLEXIPORT DISP

## (undated) DEVICE — APPLICATOR LAP 35 CM 2 RIGID VISTASEAL

## (undated) DEVICE — GLV SURG TRIUMPH MICRO PF LTX 7.5 STRL

## (undated) DEVICE — 2, DISPOSABLE SUCTION/IRRIGATOR WITHOUT DISPOSABLE TIP: Brand: STRYKEFLOW

## (undated) DEVICE — NEEDLE INSUF L150MM DIA2MM DISP FOR PNEUMOPERI ENDOPATH

## (undated) DEVICE — INTENDED TO AID IN THE PASSING OF SUTURES THROUGH BONE AND SOFT TISSUE DURING ORTHOPEDIC SURGERY: Brand: HOFFEE SUTURE RETRIEVER

## (undated) DEVICE — LARYNGOSCOPE BLDE MAC HNDL M SZ 35 ST CURAPLEX CURAVIEW LED

## (undated) DEVICE — ADHESIVE SKIN CLSR 0.7ML TOP DERMBND ADV

## (undated) DEVICE — SUTURE MCRYL SZ 4-0 L18IN ABSRB UD L19MM PS-2 3/8 CIR PRIM Y496G

## (undated) DEVICE — Device

## (undated) DEVICE — MAX-CORE® DISPOSABLE CORE BIOPSY INSTRUMENT, 18G X 20CM: Brand: MAX-CORE

## (undated) DEVICE — ANESTHESIA CIRCUIT ADULT-LF: Brand: MEDLINE INDUSTRIES, INC.

## (undated) DEVICE — ST CATH CHOLANG WKARLAN/BALN 2LUM 4F 1.25

## (undated) DEVICE — TRI-LUMEN FILTERED TUBE SET WITH ACTIVATED CHARCOAL FILTER: Brand: AIRSEAL

## (undated) DEVICE — SYRINGE MED 10ML TRNSLUC BRL PLUNG BLK MRK POLYPR CTRL

## (undated) DEVICE — TISSUE RETRIEVAL SYSTEM: Brand: INZII RETRIEVAL SYSTEM

## (undated) DEVICE — SUT VIC 0 UR6 27IN VCP603H

## (undated) DEVICE — GOWN,PRECEPT,XLNG/XXLARGE,STRL: Brand: MEDLINE

## (undated) DEVICE — SOLUTION IV 1000ML 0.9% SOD CHL FOR IRRIG PLAS CONT

## (undated) DEVICE — SOLUTION IV IRRIG WATER 1000ML POUR BRL 2F7114

## (undated) DEVICE — COVER LT HNDL BLU PLAS

## (undated) DEVICE — SYR LUERLOK 50ML

## (undated) DEVICE — VESSEL SEALER EXTEND: Brand: ENDOWRIST

## (undated) DEVICE — SPHINCTEROTOME: Brand: DREAMTOME™ RX 44

## (undated) DEVICE — CANNULA SEAL

## (undated) DEVICE — SYSTEM BX CAP BILI RAP EXCHG CAP LOK DEV COMPATIBLE W/ OLY

## (undated) DEVICE — AIRSEAL 8 MM ACCESS PORT AND LOW PROFILE OBTURATOR WITH BLADELESS OPTICAL TIP, 120 MM LENGTH: Brand: AIRSEAL

## (undated) DEVICE — PAD,NON-ADHERENT,3X8,STERILE,LF,1/PK: Brand: MEDLINE

## (undated) DEVICE — SUT VIC 3/0 RB1 27IN UD VCP215H

## (undated) DEVICE — HYPODERMIC SAFETY NEEDLE: Brand: MAGELLAN

## (undated) DEVICE — VAGINAL PREP TRAY: Brand: MEDLINE INDUSTRIES, INC.

## (undated) DEVICE — PUMP SUC IRR TBNG L10FT W/ HNDPC ASSEMB STRYKEFLOW 2

## (undated) DEVICE — ENDO KIT,LOURDES HOSPITAL: Brand: MEDLINE INDUSTRIES, INC.

## (undated) DEVICE — SOLUTION ANTIFOG VIS SYS CLEARIFY LAPSCP

## (undated) DEVICE — BLADELESS OBTURATOR: Brand: WECK VISTA

## (undated) DEVICE — GLOVE SURG SZ 75 CRM LTX FREE POLYISOPRENE POLYMER BEAD ANTI

## (undated) DEVICE — 40595 XL TRENDELENBURG POSITIONING KIT: Brand: 40595 XL TRENDELENBURG POSITIONING KIT

## (undated) DEVICE — TIP COVER ACCESSORY

## (undated) DEVICE — CLEARVIEW UTERINE MANIPULATOR, 7CM: Brand: CLEARVIEW UTERINE MANIPULATOR

## (undated) DEVICE — PAD LAP CHOLE: Brand: MEDLINE INDUSTRIES, INC.

## (undated) DEVICE — TOWEL,OR,DSP,ST,BLUE,STD,4/PK,20PK/CS: Brand: MEDLINE

## (undated) DEVICE — PK TURNOVER RM ADV

## (undated) DEVICE — Z DUPLICATE USE 2738952 SYSTEM VENT M AD NSL PAP DEV HD STRP 2L HYPRINFL BG MRI

## (undated) DEVICE — SUT VIC 4/0 P3 18IN UD VCP494H

## (undated) DEVICE — Y-TYPE TUR/BLADDER IRRIGATION SET, REGULATING CLAMP

## (undated) DEVICE — DAVINCI: Brand: MEDLINE INDUSTRIES, INC.

## (undated) DEVICE — PDS II VLT 0 107CM AG ST3: Brand: ENDOLOOP

## (undated) DEVICE — SEALANT TISS 10 CC FIBRIN VISTASEAL

## (undated) DEVICE — ENDOPOUCH RETRIEVER SPECIMEN RETRIEVAL BAGS: Brand: ENDOPOUCH RETRIEVER

## (undated) DEVICE — 3M™ IOBAN™ 2 ANTIMICROBIAL INCISE DRAPE 6650EZ: Brand: IOBAN™ 2

## (undated) DEVICE — ST TB EXT STANDARDBORE 30IN

## (undated) DEVICE — SUTURE DEV SZ 2-0 WND CLSR ABSRB GS-22 VLOC COVIDIEN VLOCM2145

## (undated) DEVICE — TUBE ET 7MM NSL ORAL BASIC CUF INTMED MURPHY EYE RADPQ MRK

## (undated) DEVICE — MONOPOLAR METZENBAUM SCISSOR, MINI BLADE TIP, DISPOSABLE: Brand: MONOPOLAR METZENBAUM SCISSOR, MINI BLADE TIP, DISPOSABLE

## (undated) DEVICE — ARM DRAPE

## (undated) DEVICE — 3M™ STERI-STRIP™ REINFORCED ADHESIVE SKIN CLOSURES, R1546, 1/4 IN X 4 IN (6 MM X 100 MM), 10 STRIPS/ENVELOPE: Brand: 3M™ STERI-STRIP™

## (undated) DEVICE — CONTRAST IOTHALAMATE MEGLUMINE 60% 50 ML INJ CONRAY 60